# Patient Record
Sex: FEMALE | Race: WHITE | NOT HISPANIC OR LATINO | Employment: OTHER | URBAN - METROPOLITAN AREA
[De-identification: names, ages, dates, MRNs, and addresses within clinical notes are randomized per-mention and may not be internally consistent; named-entity substitution may affect disease eponyms.]

---

## 2017-02-20 RX ORDER — MELOXICAM 7.5 MG/1
TABLET ORAL
Qty: 30 TABLET | Refills: 2 | Status: SHIPPED | OUTPATIENT
Start: 2017-02-20 | End: 2017-06-22 | Stop reason: SDUPTHER

## 2017-06-22 RX ORDER — MELOXICAM 7.5 MG/1
7.5 TABLET ORAL DAILY
Qty: 30 TABLET | Refills: 0 | Status: SHIPPED | OUTPATIENT
Start: 2017-06-22 | End: 2017-09-28 | Stop reason: SDUPTHER

## 2017-07-24 DIAGNOSIS — K21.9 GASTROESOPHAGEAL REFLUX DISEASE, ESOPHAGITIS PRESENCE NOT SPECIFIED: ICD-10-CM

## 2017-07-25 RX ORDER — OMEPRAZOLE 40 MG/1
CAPSULE, DELAYED RELEASE ORAL
Qty: 30 CAPSULE | Refills: 10 | OUTPATIENT
Start: 2017-07-25

## 2017-07-26 ENCOUNTER — TELEPHONE (OUTPATIENT)
Dept: GASTROENTEROLOGY | Facility: CLINIC | Age: 67
End: 2017-07-26

## 2017-07-26 DIAGNOSIS — K21.9 GASTROESOPHAGEAL REFLUX DISEASE, ESOPHAGITIS PRESENCE NOT SPECIFIED: ICD-10-CM

## 2017-07-26 RX ORDER — OMEPRAZOLE 40 MG/1
40 CAPSULE, DELAYED RELEASE ORAL DAILY
Qty: 30 CAPSULE | Refills: 1 | Status: SHIPPED | OUTPATIENT
Start: 2017-07-26 | End: 2017-09-06 | Stop reason: SDUPTHER

## 2017-07-26 NOTE — TELEPHONE ENCOUNTER
Omeprazole escribe completed.  VM to pt - identifies as Sosa.  Message left that refill completed as requested.   If any questions, contact office.

## 2017-07-26 NOTE — TELEPHONE ENCOUNTER
----- Message from Kalee Blackburn sent at 7/26/2017  9:10 AM EDT -----  Regarding: PT CALLED - SEE PENDING REFILL REQUEST  Contact: [phone]  [facility] O/V WITH DR ZAAPTA IN SEP.

## 2017-09-06 ENCOUNTER — OFFICE VISIT (OUTPATIENT)
Dept: GASTROENTEROLOGY | Facility: CLINIC | Age: 67
End: 2017-09-06

## 2017-09-06 VITALS
SYSTOLIC BLOOD PRESSURE: 126 MMHG | HEIGHT: 69 IN | WEIGHT: 189.2 LBS | DIASTOLIC BLOOD PRESSURE: 80 MMHG | BODY MASS INDEX: 28.02 KG/M2 | TEMPERATURE: 98 F

## 2017-09-06 DIAGNOSIS — K21.9 GASTROESOPHAGEAL REFLUX DISEASE, ESOPHAGITIS PRESENCE NOT SPECIFIED: ICD-10-CM

## 2017-09-06 DIAGNOSIS — K76.0 FATTY LIVER: Primary | ICD-10-CM

## 2017-09-06 PROCEDURE — 99213 OFFICE O/P EST LOW 20 MIN: CPT | Performed by: INTERNAL MEDICINE

## 2017-09-06 RX ORDER — OMEPRAZOLE 40 MG/1
40 CAPSULE, DELAYED RELEASE ORAL DAILY
Qty: 30 CAPSULE | Refills: 11 | Status: SHIPPED | OUTPATIENT
Start: 2017-09-06 | End: 2018-08-29 | Stop reason: SDUPTHER

## 2017-09-06 NOTE — PROGRESS NOTES
Chief Complaint   Patient presents with   • Heartburn       History of Present Illness: She is here to get her Omeprazole 40 mg/day. No abdominal or chest pain. No nausea, vomiting. No dysphagia. +constipaton (on Miralax), no diarrhea. No rectal bleeding or melena. Weight stable. In 2010 she had a partial colectomy for diverticular disease. She had ColoGuard in 11/16 and it was negative. She is uptight about having a colonoscopy. We reviewed the results of labs done in 12/16. She drinks most days: 0-2 glasses of wine/day. She has been told she has fatty liver.    Past Medical History:   Diagnosis Date   • Allergic    • Back pain     left when sleep on stomach  chronic   • Chicken pox     childhood   • Condition not found     VASOMOTOR EPISODES   • Constipation     chronic   • Decreased visual acuity    • Diverticulitis    • Diverticulosis    • Eye pain     have been having probelsm with left eye   • Fatigue     Fatigue / loss of energy   • Flushing    • GERD (gastroesophageal reflux disease)     resolved with omeprazole   • Hayfever    • Heartburn    • Hemorrhoids     25 years ago   • Hernia 1999   • Herpes    • History of CT scan of abdomen 07/30/2013   • History of EKG     10/26/2015 NORMAL / NO CHANGE  10/09/14   • History of mammogram     Date of last mammogram 09/2015. Mammogram Normal.  05/01/2014 DR MANZANARES   • History of Papanicolaou smear of cervix     Date of last PAP test: 09/2015. Pap Normal.   • History of TB skin testing 10/09/2014    TB Skin Test   • Hyperlipidemia    • Measles     childhood   • Melanoma     right leg   • Mumps     childhood   • Night sweats    • Pregnancy     Number of Pregnancies: 3.  Number of Miscarriages: 1.  Number of Live Births: 2.   • Sinus trouble    • Skin melanoma 03/2014    Skin cancer - melanoma - 3/2014   • Sleep trouble     Sleep problems has hot flashes, night sweats   • Visual impairment    • Weight gain        Past Surgical History:   Procedure Laterality Date    • COLON RESECTION  12/15/2010    Event: laparoscopic sigmoid colectomy  Provider: Elizabeth Rudd MD   • COLONOSCOPY  08/19/2006    multiple sigm tics, random path neg   • COLONOSCOPY  2006   • ENDOSCOPY  08/19/2006    EGD-erythamatous gastropathy, neg celiac, neg h pylori   • FEMORAL HERNIA REPAIR  2000   • HEMORRHOIDECTOMY     • HERNIA REPAIR Right 1999    hernia in R thigh   • OTHER SURGICAL HISTORY Right     MELANOMA RIGHT LEG   • PAP SMEAR  05/01/2014    DR MANZANARES         Current Outpatient Prescriptions:   •  cetirizine (ZyrTEC) 10 MG tablet, Take 10 mg by mouth daily, Disp: , Rfl:   •  meloxicam (MOBIC) 7.5 MG tablet, Take 1 tablet by mouth Daily., Disp: 30 tablet, Rfl: 0  •  omeprazole (priLOSEC) 40 MG capsule, Take 1 capsule by mouth Daily., Disp: 30 capsule, Rfl: 11  •  PREMPRO 0.3-1.5 MG per tablet, Take 1 tablet by mouth Daily., Disp: , Rfl:     Allergies   Allergen Reactions   • Doxycycline        Family History   Problem Relation Age of Onset   • Arthritis Father      osteoarthritis   • Cancer Father      esophageal   • Hypertension Father    • Ulcers Father      gastric   • Glaucoma Father      also macular degeneration   • Alcohol abuse Father    • Colon polyps Father      colon polyps but no cancer   • Arthritis Paternal Grandmother      rheumatoid   • Alcohol abuse Paternal Uncle    • Breast cancer Paternal Aunt    • Arthritis Maternal Grandmother        Social History     Social History   • Marital status:      Spouse name: N/A   • Number of children: N/A   • Years of education: N/A     Occupational History   • Not on file.     Social History Main Topics   • Smoking status: Never Smoker   • Smokeless tobacco: Never Used   • Alcohol use 6.0 oz/week     10 Glasses of wine per week   • Drug use: No   • Sexual activity: Defer     Other Topics Concern   • Not on file     Social History Narrative       Review of Systems   All other systems reviewed and are negative.      Vitals:    09/06/17 0541    BP: 126/80   Temp: 98 °F (36.7 °C)       Physical Exam   Constitutional: She is oriented to person, place, and time. She appears well-developed and well-nourished. No distress.   HENT:   Head: Normocephalic and atraumatic. Hair is normal.   Right Ear: Hearing, tympanic membrane, external ear and ear canal normal. No drainage. No decreased hearing is noted.   Left Ear: Hearing, tympanic membrane, external ear and ear canal normal. No decreased hearing is noted.   Nose: No nasal deformity.   Mouth/Throat: Oropharynx is clear and moist.   Eyes: Conjunctivae, EOM and lids are normal. Pupils are equal, round, and reactive to light. Right eye exhibits no discharge. Left eye exhibits no discharge.   Neck: Normal range of motion. Neck supple. No JVD present. No tracheal deviation present. No thyromegaly present.   Cardiovascular: Normal rate, regular rhythm, normal heart sounds, intact distal pulses and normal pulses.  Exam reveals no gallop and no friction rub.    No murmur heard.  Pulmonary/Chest: Effort normal and breath sounds normal. No respiratory distress. She has no wheezes. She has no rales. She exhibits no tenderness.   Abdominal: Soft. Bowel sounds are normal. She exhibits no distension and no mass. There is no tenderness. There is no rebound and no guarding. No hernia.   Genitourinary: Rectal exam shows guaiac negative stool.   Genitourinary Comments: Normal anorectal exam.   Musculoskeletal: Normal range of motion. She exhibits no edema, tenderness or deformity.   Lymphadenopathy:     She has no cervical adenopathy.   Neurological: She is alert and oriented to person, place, and time. She has normal reflexes. She displays normal reflexes. No cranial nerve deficit. She exhibits normal muscle tone. Coordination normal.   Skin: Skin is warm and dry. No rash noted. She is not diaphoretic. No erythema.   Psychiatric: She has a normal mood and affect. Her behavior is normal. Judgment and thought content normal.    Vitals reviewed.      Sosa was seen today for heartburn.    Diagnoses and all orders for this visit:    Fatty liver    Gastroesophageal reflux disease, esophagitis presence not specified  -     omeprazole (priLOSEC) 40 MG capsule; Take 1 capsule by mouth Daily.     Assessment:  1) GERD  2) h/o diverticulitis, s/p partial colectomy  3) Fatty liver.    Recommendations:  1) We discussed the pros and cons of chronic PPI use.   2) f/u one year.       No Follow-up on file.    Vincent Greenwood MD  9/6/2017

## 2017-09-28 ENCOUNTER — OFFICE VISIT (OUTPATIENT)
Dept: FAMILY MEDICINE CLINIC | Facility: CLINIC | Age: 67
End: 2017-09-28

## 2017-09-28 VITALS
WEIGHT: 191 LBS | OXYGEN SATURATION: 99 % | DIASTOLIC BLOOD PRESSURE: 72 MMHG | BODY MASS INDEX: 28.29 KG/M2 | SYSTOLIC BLOOD PRESSURE: 116 MMHG | HEART RATE: 76 BPM | TEMPERATURE: 97.6 F | HEIGHT: 69 IN

## 2017-09-28 DIAGNOSIS — Z79.890 POSTMENOPAUSAL HRT (HORMONE REPLACEMENT THERAPY): ICD-10-CM

## 2017-09-28 DIAGNOSIS — R79.89 OTHER SPECIFIED ABNORMAL FINDINGS OF BLOOD CHEMISTRY: ICD-10-CM

## 2017-09-28 DIAGNOSIS — M19.91 PRIMARY OSTEOARTHRITIS, UNSPECIFIED SITE: Primary | ICD-10-CM

## 2017-09-28 DIAGNOSIS — K21.9 GASTROESOPHAGEAL REFLUX DISEASE WITHOUT ESOPHAGITIS: ICD-10-CM

## 2017-09-28 DIAGNOSIS — R73.03 PREDIABETES: ICD-10-CM

## 2017-09-28 PROCEDURE — 99213 OFFICE O/P EST LOW 20 MIN: CPT | Performed by: FAMILY MEDICINE

## 2017-09-28 RX ORDER — MELOXICAM 7.5 MG/1
7.5 TABLET ORAL DAILY
Qty: 30 TABLET | Refills: 5 | Status: SHIPPED | OUTPATIENT
Start: 2017-09-28 | End: 2018-07-11 | Stop reason: SDUPTHER

## 2017-09-28 NOTE — PROGRESS NOTES
"  No chief complaint on file.      Subjective.../HPI  Patient present today with oa and takes meloxicam . lucy only takes qod .   - had cscope in 2006 and colon resection 2010 then cologuard in 2016. Sees dr greenwood  - has gerd and hrt  I have reviewed the patient's medical history in detail and updated the computerized patient record.    Family History   Problem Relation Age of Onset   • Arthritis Father      osteoarthritis   • Cancer Father      esophageal   • Hypertension Father    • Ulcers Father      gastric   • Glaucoma Father      also macular degeneration   • Alcohol abuse Father    • Colon polyps Father      colon polyps but no cancer   • Arthritis Paternal Grandmother      rheumatoid   • Alcohol abuse Paternal Uncle    • Breast cancer Paternal Aunt    • Arthritis Maternal Grandmother        Social History     Social History   • Marital status:      Spouse name: N/A   • Number of children: N/A   • Years of education: N/A     Occupational History   • Not on file.     Social History Main Topics   • Smoking status: Never Smoker   • Smokeless tobacco: Never Used   • Alcohol use 6.0 oz/week     10 Glasses of wine per week   • Drug use: No   • Sexual activity: Defer     Other Topics Concern   • Not on file     Social History Narrative       Review of Systems:   Review of Systems   Constitutional: Negative.    HENT: Negative.    Respiratory: Negative.    Cardiovascular: Negative.    Gastrointestinal: Negative.    Endocrine: Negative.    Genitourinary: Negative.    Musculoskeletal: Negative.    Skin: Negative.    Allergic/Immunologic: Negative.    Neurological: Negative.    Hematological: Negative.    Psychiatric/Behavioral: Negative.        Objective:  Vital Signs     Vitals:    09/28/17 1603   BP: 116/72   BP Location: Left arm   Patient Position: Sitting   Pulse: 76   Temp: 97.6 °F (36.4 °C)   TempSrc: Oral   SpO2: 99%   Weight: 191 lb (86.6 kg)   Height: 69\" (175.3 cm)     Physical Exam   Constitutional: " She is oriented to person, place, and time. She appears well-developed and well-nourished.   HENT:   Head: Normocephalic.   Eyes: Pupils are equal, round, and reactive to light.   Neck: Normal range of motion.   Cardiovascular: Normal rate, regular rhythm and normal heart sounds.    Pulmonary/Chest: Effort normal.   Abdominal: Soft. Bowel sounds are normal. She exhibits no distension and no mass. There is no tenderness. There is no rebound and no guarding.   Musculoskeletal: Normal range of motion.   Neurological: She is alert and oriented to person, place, and time.   Skin: Skin is warm and dry.        Results Review:      REVIEWED AND DISCUSSED CLINICAL RESULTS WITH PATIENT                          Current Outpatient Prescriptions:   •  cetirizine (ZyrTEC) 10 MG tablet, Take 10 mg by mouth daily, Disp: , Rfl:   •  meloxicam (MOBIC) 7.5 MG tablet, Take 1 tablet by mouth Daily., Disp: 30 tablet, Rfl: 5  •  omeprazole (priLOSEC) 40 MG capsule, Take 1 capsule by mouth Daily., Disp: 30 capsule, Rfl: 11  •  PREMPRO 0.3-1.5 MG per tablet, Take 1 tablet by mouth Daily., Disp: , Rfl:     Procedures    Assessment/Plan     Diagnoses and all orders for this visit:    Primary osteoarthritis, unspecified site  -     CBC & Differential  -     Comprehensive Metabolic Panel  -     Hemoglobin A1c  -     Lipid Panel With LDL / HDL Ratio  -     Thyroid Panel With TSH    Gastroesophageal reflux disease without esophagitis  -     CBC & Differential  -     Comprehensive Metabolic Panel  -     Hemoglobin A1c  -     Lipid Panel With LDL / HDL Ratio  -     Thyroid Panel With TSH    Postmenopausal HRT (hormone replacement therapy)  -     CBC & Differential  -     Comprehensive Metabolic Panel  -     Hemoglobin A1c  -     Lipid Panel With LDL / HDL Ratio  -     Thyroid Panel With TSH    Other specified abnormal findings of blood chemistry   -     Hemoglobin A1c  -     Lipid Panel With LDL / HDL Ratio    Prediabetes   -     Thyroid Panel With  TSH    Other orders  -     meloxicam (MOBIC) 7.5 MG tablet; Take 1 tablet by mouth Daily.         Adam Garber Jr., MD  09/28/17  5:09 PM

## 2018-07-11 ENCOUNTER — OFFICE VISIT (OUTPATIENT)
Dept: INTERNAL MEDICINE | Facility: CLINIC | Age: 68
End: 2018-07-11

## 2018-07-11 VITALS
TEMPERATURE: 98.8 F | DIASTOLIC BLOOD PRESSURE: 70 MMHG | SYSTOLIC BLOOD PRESSURE: 126 MMHG | BODY MASS INDEX: 28.64 KG/M2 | RESPIRATION RATE: 16 BRPM | HEIGHT: 69 IN | OXYGEN SATURATION: 98 % | HEART RATE: 75 BPM | WEIGHT: 193.4 LBS

## 2018-07-11 DIAGNOSIS — E78.00 ELEVATED LDL CHOLESTEROL LEVEL: Primary | ICD-10-CM

## 2018-07-11 DIAGNOSIS — K21.9 GASTROESOPHAGEAL REFLUX DISEASE, ESOPHAGITIS PRESENCE NOT SPECIFIED: ICD-10-CM

## 2018-07-11 DIAGNOSIS — R73.09 ELEVATED GLUCOSE: ICD-10-CM

## 2018-07-11 DIAGNOSIS — M47.817 FACET ARTHRITIS OF LUMBOSACRAL REGION: ICD-10-CM

## 2018-07-11 PROCEDURE — 99213 OFFICE O/P EST LOW 20 MIN: CPT | Performed by: FAMILY MEDICINE

## 2018-07-11 RX ORDER — MELOXICAM 7.5 MG/1
7.5 TABLET ORAL DAILY
Qty: 90 TABLET | Refills: 3 | Status: SHIPPED | OUTPATIENT
Start: 2018-07-11 | End: 2019-07-10 | Stop reason: SDUPTHER

## 2018-07-11 NOTE — PROGRESS NOTES
Subjective   Sosa Diamond is a 68 y.o. female.     Chief Complaint   Patient presents with   • Hyperlipidemia   • Osteoarthritis   • Back Pain         History of Present Illness   Patient is delightful lady who is previously seen Dr. Garber.  She is a  at [employer].  History includes some history of treatment of hyperlipidemia and also facet arthritis of the back osteoarthritis of the knees and possible hip arthralgias.  She takes occasional meloxicam 7.5 mg.      The following portions of the patient's history were reviewed and updated as appropriate: allergies, current medications, past social history and problem list.    Review of Systems   Constitutional: Negative.    Eyes: Negative.    Respiratory: Negative.    Cardiovascular: Negative.    Gastrointestinal: Negative.    Endocrine: Negative.    Genitourinary: Negative.    Musculoskeletal: Positive for arthralgias.   Skin: Negative.    Allergic/Immunologic: Negative.    Neurological: Negative.    Hematological: Negative.    Psychiatric/Behavioral: Negative.        Objective   Vitals:    07/11/18 1206   BP: 126/70   Pulse: 75   Resp: 16   Temp: 98.8 °F (37.1 °C)   SpO2: 98%     Physical Exam   Constitutional: She is oriented to person, place, and time. She appears well-developed.   HENT:   Head: Normocephalic.   Right Ear: Tympanic membrane and external ear normal.   Left Ear: Tympanic membrane and external ear normal.   Mouth/Throat: Oropharynx is clear and moist.   Eyes: Pupils are equal, round, and reactive to light.   Neck: Normal range of motion. Neck supple.   Cardiovascular: Normal rate, regular rhythm and normal heart sounds.    Pulmonary/Chest: Effort normal and breath sounds normal.   Abdominal: Soft. Bowel sounds are normal.   Musculoskeletal:        Right knee: She exhibits decreased range of motion.        Left knee: She exhibits decreased range of motion.        Lumbar back: She exhibits decreased range of motion.   Neurological: She  is alert and oriented to person, place, and time.   Psychiatric: She has a normal mood and affect.   Vitals reviewed.      Assessment/Plan   Problem List Items Addressed This Visit     None      Visit Diagnoses     Elevated LDL cholesterol level    -  Primary    Relevant Orders    CBC & Differential    Comprehensive Metabolic Panel    Hemoglobin A1c    Lipid Panel With / Chol / HDL Ratio    TSH    T4, Free    Gastroesophageal reflux disease, esophagitis presence not specified        Relevant Orders    CBC & Differential    Comprehensive Metabolic Panel    Hemoglobin A1c    Lipid Panel With / Chol / HDL Ratio    TSH    T4, Free    Facet arthritis of lumbosacral region (CMS/HCC)        Elevated glucose         Relevant Orders    Hemoglobin A1c      Plan: Screening labs to be done prior to Medicare wellness in October and November refill meloxicam.  Also refill omeprazole.

## 2018-07-25 ENCOUNTER — RESULTS ENCOUNTER (OUTPATIENT)
Dept: INTERNAL MEDICINE | Facility: CLINIC | Age: 68
End: 2018-07-25

## 2018-07-25 DIAGNOSIS — R73.09 ELEVATED GLUCOSE: ICD-10-CM

## 2018-07-25 DIAGNOSIS — K21.9 GASTROESOPHAGEAL REFLUX DISEASE, ESOPHAGITIS PRESENCE NOT SPECIFIED: ICD-10-CM

## 2018-07-25 DIAGNOSIS — E78.00 ELEVATED LDL CHOLESTEROL LEVEL: ICD-10-CM

## 2018-08-29 ENCOUNTER — OFFICE VISIT (OUTPATIENT)
Dept: GASTROENTEROLOGY | Facility: CLINIC | Age: 68
End: 2018-08-29

## 2018-08-29 VITALS
DIASTOLIC BLOOD PRESSURE: 84 MMHG | WEIGHT: 189.8 LBS | BODY MASS INDEX: 28.11 KG/M2 | HEIGHT: 69 IN | SYSTOLIC BLOOD PRESSURE: 126 MMHG | TEMPERATURE: 98.2 F

## 2018-08-29 DIAGNOSIS — K21.9 GASTROESOPHAGEAL REFLUX DISEASE, ESOPHAGITIS PRESENCE NOT SPECIFIED: ICD-10-CM

## 2018-08-29 DIAGNOSIS — K76.0 FATTY LIVER: Primary | ICD-10-CM

## 2018-08-29 PROCEDURE — 99213 OFFICE O/P EST LOW 20 MIN: CPT | Performed by: INTERNAL MEDICINE

## 2018-08-29 RX ORDER — OMEPRAZOLE 40 MG/1
40 CAPSULE, DELAYED RELEASE ORAL DAILY
Qty: 30 CAPSULE | Refills: 11 | Status: SHIPPED | OUTPATIENT
Start: 2018-08-29 | End: 2019-09-30 | Stop reason: SDUPTHER

## 2018-08-29 NOTE — PROGRESS NOTES
Chief Complaint   Patient presents with   • Follow-up   • Heartburn     medication refill       History of Present Illness: 69 yo female with GERD. She only takes the Mobic a couple of times/week. When she stops the Omeprazole she has heartburn. No abdominal or chest pain. No nasuea or vomiting. No diverticulitis. No rectal bleeding or melena. She had a ColoGuard one year ago and it was negative. Weight stable.     Past Medical History:   Diagnosis Date   • Allergic    • Back pain     left when sleep on stomach  chronic   • Chicken pox     childhood   • Condition not found     VASOMOTOR EPISODES   • Constipation     chronic   • Decreased visual acuity    • Diverticulitis    • Diverticulosis    • Eye pain     have been having probelsm with left eye   • Fatigue     Fatigue / loss of energy   • Flushing    • GERD (gastroesophageal reflux disease)     resolved with omeprazole   • Hayfever    • Heartburn    • Hemorrhoids     25 years ago   • Hernia 1999   • Herpes    • History of CT scan of abdomen 07/30/2013   • History of EKG     10/26/2015 NORMAL / NO CHANGE  10/09/14   • History of mammogram     Date of last mammogram 09/2015. Mammogram Normal.  05/01/2014 DR MANZANARES   • History of Papanicolaou smear of cervix     Date of last PAP test: 09/2015. Pap Normal.   • History of TB skin testing 10/09/2014    TB Skin Test   • Hyperlipidemia    • Measles     childhood   • Melanoma (CMS/HCC)     right leg   • Mumps     childhood   • Night sweats    • Pregnancy     Number of Pregnancies: 3.  Number of Miscarriages: 1.  Number of Live Births: 2.   • Sinus trouble    • Skin melanoma (CMS/HCC) 03/2014    Skin cancer - melanoma - 3/2014   • Sleep trouble     Sleep problems has hot flashes, night sweats   • Visual impairment    • Weight gain        Past Surgical History:   Procedure Laterality Date   • COLON RESECTION  12/15/2010    Event: laparoscopic sigmoid colectomy  Provider: Elizabeth Rudd MD   • COLONOSCOPY  08/19/2006     multiple sigm tics, random path neg   • COLONOSCOPY  2006   • ENDOSCOPY  08/19/2006    EGD-erythamatous gastropathy, neg celiac, neg h pylori   • FEMORAL HERNIA REPAIR  2000   • HEMORRHOIDECTOMY     • HERNIA REPAIR Right 1999    hernia in R thigh   • OTHER SURGICAL HISTORY Right     MELANOMA RIGHT LEG   • PAP SMEAR  05/01/2014    DR MANZANARES         Current Outpatient Prescriptions:   •  cetirizine (ZyrTEC) 10 MG tablet, Take 10 mg by mouth daily, Disp: , Rfl:   •  meloxicam (MOBIC) 7.5 MG tablet, Take 1 tablet by mouth Daily. (Patient taking differently: Take 7.5 mg by mouth 3 (Three) Times a Week.), Disp: 90 tablet, Rfl: 3  •  omeprazole (priLOSEC) 40 MG capsule, Take 1 capsule by mouth Daily., Disp: 30 capsule, Rfl: 11  •  PREMPRO 0.3-1.5 MG per tablet, Take 1 tablet by mouth Daily., Disp: , Rfl:     Allergies   Allergen Reactions   • Doxycycline Itching       Family History   Problem Relation Age of Onset   • Arthritis Father         osteoarthritis   • Cancer Father         esophageal   • Hypertension Father    • Ulcers Father         gastric   • Glaucoma Father         also macular degeneration   • Alcohol abuse Father    • Colon polyps Father         colon polyps but no cancer   • Arthritis Paternal Grandmother         rheumatoid   • Alcohol abuse Paternal Uncle    • Breast cancer Paternal Aunt    • Arthritis Maternal Grandmother        Social History     Social History   • Marital status:      Spouse name: N/A   • Number of children: N/A   • Years of education: N/A     Occupational History   • Not on file.     Social History Main Topics   • Smoking status: Never Smoker   • Smokeless tobacco: Never Used   • Alcohol use 6.0 oz/week     10 Glasses of wine per week   • Drug use: No   • Sexual activity: Defer     Other Topics Concern   • Not on file     Social History Narrative   • No narrative on file       Review of Systems   All other systems reviewed and are negative.      Vitals:    08/29/18 1617   BP:  126/84   Temp: 98.2 °F (36.8 °C)       Physical Exam   Constitutional: She is oriented to person, place, and time. She appears well-developed and well-nourished. No distress.   HENT:   Head: Normocephalic and atraumatic. Hair is normal.   Right Ear: Hearing, tympanic membrane, external ear and ear canal normal. No drainage. No decreased hearing is noted.   Left Ear: Hearing, tympanic membrane, external ear and ear canal normal. No decreased hearing is noted.   Nose: No nasal deformity.   Mouth/Throat: Oropharynx is clear and moist.   Eyes: Pupils are equal, round, and reactive to light. Conjunctivae, EOM and lids are normal. Right eye exhibits no discharge. Left eye exhibits no discharge.   Neck: Normal range of motion. Neck supple. No JVD present. No tracheal deviation present. No thyromegaly present.   Cardiovascular: Normal rate, regular rhythm, normal heart sounds, intact distal pulses and normal pulses.  Exam reveals no gallop and no friction rub.    No murmur heard.  Pulmonary/Chest: Effort normal and breath sounds normal. No respiratory distress. She has no wheezes. She has no rales. She exhibits no tenderness.   Abdominal: Soft. Bowel sounds are normal. She exhibits no distension and no mass. There is no tenderness. There is no rebound and no guarding. No hernia.   Musculoskeletal: Normal range of motion. She exhibits no edema, tenderness or deformity.   Lymphadenopathy:     She has no cervical adenopathy.   Neurological: She is alert and oriented to person, place, and time. She has normal reflexes. She displays normal reflexes. No cranial nerve deficit. She exhibits normal muscle tone. Coordination normal.   Skin: Skin is warm and dry. No rash noted. She is not diaphoretic. No erythema.   Psychiatric: She has a normal mood and affect. Her behavior is normal. Judgment and thought content normal.   Vitals reviewed.      Sosa was seen today for follow-up and heartburn.    Diagnoses and all orders for this  visit:    Fatty liver    Gastroesophageal reflux disease, esophagitis presence not specified  -     omeprazole (priLOSEC) 40 MG capsule; Take 1 capsule by mouth Daily.      Assessment:  1) GERD  2) h/o diverticulitis, s/p partial colectomy  3) Fatty liver    Recommendations:  1) Continue the Omeprazole 40 mg/day  2) f/u one year.     Return in about 1 year (around 8/29/2019).    Vincent Greenwood MD  8/29/2018

## 2018-10-05 LAB
ALBUMIN SERPL-MCNC: 4.8 G/DL (ref 3.5–5.2)
ALBUMIN/GLOB SERPL: 1.8 G/DL
ALP SERPL-CCNC: 77 U/L (ref 39–117)
ALT SERPL-CCNC: 38 U/L (ref 1–33)
AST SERPL-CCNC: 28 U/L (ref 1–32)
BASOPHILS # BLD AUTO: 0.03 10*3/MM3 (ref 0–0.2)
BASOPHILS NFR BLD AUTO: 0.5 % (ref 0–1.5)
BILIRUB SERPL-MCNC: 0.5 MG/DL (ref 0.1–1.2)
BUN SERPL-MCNC: 12 MG/DL (ref 8–23)
BUN/CREAT SERPL: 16 (ref 7–25)
CALCIUM SERPL-MCNC: 9.9 MG/DL (ref 8.6–10.5)
CHLORIDE SERPL-SCNC: 99 MMOL/L (ref 98–107)
CHOLEST SERPL-MCNC: 223 MG/DL (ref 0–200)
CHOLEST/HDLC SERPL: 3.66 {RATIO}
CO2 SERPL-SCNC: 25.5 MMOL/L (ref 22–29)
CREAT SERPL-MCNC: 0.75 MG/DL (ref 0.57–1)
EOSINOPHIL # BLD AUTO: 0.07 10*3/MM3 (ref 0–0.7)
EOSINOPHIL NFR BLD AUTO: 1.2 % (ref 0.3–6.2)
ERYTHROCYTE [DISTWIDTH] IN BLOOD BY AUTOMATED COUNT: 13.1 % (ref 11.7–13)
GLOBULIN SER CALC-MCNC: 2.7 GM/DL
GLUCOSE SERPL-MCNC: 94 MG/DL (ref 65–99)
HBA1C MFR BLD: 4.9 % (ref 4.8–5.6)
HCT VFR BLD AUTO: 43.2 % (ref 35.6–45.5)
HDLC SERPL-MCNC: 61 MG/DL (ref 40–60)
HGB BLD-MCNC: 14.9 G/DL (ref 11.9–15.5)
IMM GRANULOCYTES # BLD: 0.01 10*3/MM3 (ref 0–0.03)
IMM GRANULOCYTES NFR BLD: 0.2 % (ref 0–0.5)
LDLC SERPL CALC-MCNC: 127 MG/DL (ref 0–100)
LYMPHOCYTES # BLD AUTO: 1.92 10*3/MM3 (ref 0.9–4.8)
LYMPHOCYTES NFR BLD AUTO: 31.6 % (ref 19.6–45.3)
MCH RBC QN AUTO: 32.7 PG (ref 26.9–32)
MCHC RBC AUTO-ENTMCNC: 34.5 G/DL (ref 32.4–36.3)
MCV RBC AUTO: 94.7 FL (ref 80.5–98.2)
MONOCYTES # BLD AUTO: 0.34 10*3/MM3 (ref 0.2–1.2)
MONOCYTES NFR BLD AUTO: 5.6 % (ref 5–12)
NEUTROPHILS # BLD AUTO: 3.71 10*3/MM3 (ref 1.9–8.1)
NEUTROPHILS NFR BLD AUTO: 61.1 % (ref 42.7–76)
PLATELET # BLD AUTO: 293 10*3/MM3 (ref 140–500)
POTASSIUM SERPL-SCNC: 4.2 MMOL/L (ref 3.5–5.2)
PROT SERPL-MCNC: 7.5 G/DL (ref 6–8.5)
RBC # BLD AUTO: 4.56 10*6/MM3 (ref 3.9–5.2)
SODIUM SERPL-SCNC: 137 MMOL/L (ref 136–145)
T4 FREE SERPL-MCNC: 1.03 NG/DL (ref 0.93–1.7)
TRIGL SERPL-MCNC: 176 MG/DL (ref 0–150)
TSH SERPL DL<=0.005 MIU/L-ACNC: 1.89 MIU/ML (ref 0.27–4.2)
VLDLC SERPL CALC-MCNC: 35.2 MG/DL (ref 5–40)
WBC # BLD AUTO: 6.07 10*3/MM3 (ref 4.5–10.7)

## 2018-10-30 ENCOUNTER — OFFICE VISIT (OUTPATIENT)
Dept: INTERNAL MEDICINE | Facility: CLINIC | Age: 68
End: 2018-10-30

## 2018-10-30 VITALS
SYSTOLIC BLOOD PRESSURE: 134 MMHG | HEART RATE: 97 BPM | WEIGHT: 196 LBS | DIASTOLIC BLOOD PRESSURE: 96 MMHG | TEMPERATURE: 98.1 F | BODY MASS INDEX: 29.8 KG/M2 | OXYGEN SATURATION: 97 %

## 2018-10-30 DIAGNOSIS — Z00.00 MEDICARE ANNUAL WELLNESS VISIT, SUBSEQUENT: ICD-10-CM

## 2018-10-30 DIAGNOSIS — E55.9 VITAMIN D DEFICIENCY: ICD-10-CM

## 2018-10-30 DIAGNOSIS — E78.2 MIXED HYPERLIPIDEMIA: Primary | ICD-10-CM

## 2018-10-30 DIAGNOSIS — M25.562 LEFT KNEE PAIN, UNSPECIFIED CHRONICITY: ICD-10-CM

## 2018-10-30 PROCEDURE — G0439 PPPS, SUBSEQ VISIT: HCPCS | Performed by: FAMILY MEDICINE

## 2018-10-30 PROCEDURE — 99213 OFFICE O/P EST LOW 20 MIN: CPT | Performed by: FAMILY MEDICINE
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
## 2018-10-30 NOTE — PROGRESS NOTES
QUICK REFERENCE INFORMATION:  The ABCs of the Annual Wellness Visit    Subsequent Medicare Wellness Visit    HEALTH RISK ASSESSMENT    1950    Recent Hospitalizations:  No hospitalization(s) within the last year..        Current Medical Providers:  Patient Care Team:  Zaheer Mccrary Jr., MD as PCP - General (Family Medicine)  Tomasa Strong MD as PCP - Claims Attributed        Smoking Status:  History   Smoking Status   • Never Smoker   Smokeless Tobacco   • Never Used       Alcohol Consumption:  History   Alcohol Use   • 6.0 oz/week   • 10 Glasses of wine per week       Depression Screen:   PHQ-2/PHQ-9 Depression Screening 10/30/2018   Little interest or pleasure in doing things 0   Feeling down, depressed, or hopeless 0   Total Score 0       Health Habits and Functional and Cognitive Screening:  Functional & Cognitive Status 10/30/2018   Do you have difficulty preparing food and eating? No   Do you have difficulty bathing yourself, getting dressed or grooming yourself? No   Do you have difficulty using the toilet? No   Do you have difficulty moving around from place to place? No   Do you have trouble with steps or getting out of a bed or a chair? No   In the past year have you fallen or experienced a near fall? No   Current Diet Well Balanced Diet   Dental Exam Up to date   Eye Exam Up to date   Exercise (times per week) 0 times per week   Current Exercise Activities Include None   Do you need help using the phone?  No   Are you deaf or do you have serious difficulty hearing?  No   Do you need help with transportation? No   Do you need help shopping? No   Do you need help preparing meals?  No   Do you need help with housework?  No   Do you need help with laundry? No   Do you need help taking your medications? No   Do you need help managing money? No   Do you ever drive or ride in a car without wearing a seat belt? No   Have you felt unusual stress, anger or loneliness in the last month? No   Who do you live  with? Spouse   If you need help, do you have trouble finding someone available to you? No   Have you been bothered in the last four weeks by sexual problems? No   Do you have difficulty concentrating, remembering or making decisions? No           Does the patient have evidence of cognitive impairment? No    Aspirin use counseling: Does not need ASA (and currently is not on it)      Recent Lab Results:  CMP:  Lab Results   Component Value Date    GLU 94 10/05/2018    BUN 12 10/05/2018    CREATININE 0.75 10/05/2018    EGFRIFNONA 77 10/05/2018    EGFRIFAFRI 94 10/05/2018    BCR 16.0 10/05/2018     10/05/2018    K 4.2 10/05/2018    CO2 25.5 10/05/2018    CALCIUM 9.9 10/05/2018    PROTENTOTREF 7.5 10/05/2018    ALBUMIN 4.80 10/05/2018    LABGLOBREF 2.7 10/05/2018    LABIL2 1.8 10/05/2018    BILITOT 0.5 10/05/2018    ALKPHOS 77 10/05/2018    AST 28 10/05/2018    ALT 38 (H) 10/05/2018     Lipid Panel:  Lab Results   Component Value Date    TRIG 176 (H) 10/05/2018    HDL 61 (H) 10/05/2018    VLDL 35.2 10/05/2018    LDLHDL 2.4 10/27/2016     HbA1c:  Lab Results   Component Value Date    HGBA1C 4.90 10/05/2018       Visual Acuity:  No exam data present    Age-appropriate Screening Schedule:  Refer to the list below for future screening recommendations based on patient's age, sex and/or medical conditions. Orders for these recommended tests are listed in the plan section. The patient has been provided with a written plan.    Health Maintenance   Topic Date Due   • COLONOSCOPY  07/06/2016   • ZOSTER VACCINE (2 of 3) 01/27/2017   • PNEUMOCOCCAL VACCINES (65+ LOW/MEDIUM RISK) (1 of 2 - PCV13) 05/19/2017   • INFLUENZA VACCINE  08/01/2018   • LIPID PANEL  10/05/2019   • MAMMOGRAM  11/08/2019   • TDAP/TD VACCINES (2 - Td) 10/09/2025        Subjective   History of Present Illness    Sosa Diamond is a 68 y.o. female who presents for an Subsequent Wellness Visit.    The following portions of the patient's history were reviewed  and updated as appropriate: allergies, current medications, past family history, past medical history, past social history, past surgical history and problem list.    Outpatient Medications Prior to Visit   Medication Sig Dispense Refill   • cetirizine (ZyrTEC) 10 MG tablet Take 10 mg by mouth daily     • meloxicam (MOBIC) 7.5 MG tablet Take 1 tablet by mouth Daily. (Patient taking differently: Take 7.5 mg by mouth 3 (Three) Times a Week.) 90 tablet 3   • omeprazole (priLOSEC) 40 MG capsule Take 1 capsule by mouth Daily. 30 capsule 11   • PREMPRO 0.3-1.5 MG per tablet Take 1 tablet by mouth Daily.     • fluticasone (FLONASE) 50 MCG/ACT nasal spray 2 sprays into the nostril(s) as directed by provider Daily.     • acetaminophen (TYLENOL) 100 MG/ML solution Take 10 mg/kg by mouth Every 4 (Four) Hours As Needed for Fever.     • amoxicillin-clavulanate (AUGMENTIN) 875-125 MG per tablet 1 pill q12 28 tablet 0   • ibuprofen (ADVIL,MOTRIN) 100 MG/5ML suspension Take  by mouth Every 6 (Six) Hours As Needed for Mild Pain .       No facility-administered medications prior to visit.        There is no problem list on file for this patient.      Advance Care Planning:  has an advance directive - a copy has been provided and is in file    Identification of Risk Factors:  Risk factors include: cardiovascular risk.    Review of Systems    Compared to one year ago, the patient feels her physical health is worse.  Compared to one year ago, the patient feels her mental health is the same.    Objective     Physical Exam    Vitals:    10/30/18 1348   BP: 134/96   BP Location: Left arm   Patient Position: Sitting   Cuff Size: Adult   Pulse: 97   Temp: 98.1 °F (36.7 °C)   TempSrc: Tympanic   SpO2: 97%   Weight: 88.9 kg (196 lb)   PainSc: 0-No pain       Patient's Body mass index is 29.8 kg/m². BMI is above normal parameters. Recommendations include: educational material.      Assessment/Plan   Patient Self-Management and Personalized  Health Advice  The patient has been provided with information about: prevention of cardiac or vascular disease and preventive services including:   · Counseling for cardiovascular disease risk reduction.    Visit Diagnoses:  No diagnosis found.    No orders of the defined types were placed in this encounter.      Outpatient Encounter Prescriptions as of 10/30/2018   Medication Sig Dispense Refill   • cetirizine (ZyrTEC) 10 MG tablet Take 10 mg by mouth daily     • meloxicam (MOBIC) 7.5 MG tablet Take 1 tablet by mouth Daily. (Patient taking differently: Take 7.5 mg by mouth 3 (Three) Times a Week.) 90 tablet 3   • omeprazole (priLOSEC) 40 MG capsule Take 1 capsule by mouth Daily. 30 capsule 11   • PREMPRO 0.3-1.5 MG per tablet Take 1 tablet by mouth Daily.     • fluticasone (FLONASE) 50 MCG/ACT nasal spray 2 sprays into the nostril(s) as directed by provider Daily.     • [DISCONTINUED] acetaminophen (TYLENOL) 100 MG/ML solution Take 10 mg/kg by mouth Every 4 (Four) Hours As Needed for Fever.     • [DISCONTINUED] amoxicillin-clavulanate (AUGMENTIN) 875-125 MG per tablet 1 pill q12 28 tablet 0   • [DISCONTINUED] ibuprofen (ADVIL,MOTRIN) 100 MG/5ML suspension Take  by mouth Every 6 (Six) Hours As Needed for Mild Pain .       No facility-administered encounter medications on file as of 10/30/2018.        Reviewed use of high risk medication in the elderly: not applicable  Reviewed for potential of harmful drug interactions in the elderly: not applicable    Follow Up:  No Follow-up on file.     An After Visit Summary and PPPS with all of these plans were given to the patient.

## 2019-07-10 LAB
25(OH)D3+25(OH)D2 SERPL-MCNC: 44.9 NG/ML (ref 30–100)
ALBUMIN SERPL-MCNC: 4.4 G/DL (ref 3.5–5.2)
ALBUMIN/GLOB SERPL: 2 G/DL
ALP SERPL-CCNC: 63 U/L (ref 39–117)
ALT SERPL-CCNC: 27 U/L (ref 1–33)
AST SERPL-CCNC: 29 U/L (ref 1–32)
BILIRUB SERPL-MCNC: 0.5 MG/DL (ref 0.2–1.2)
BUN SERPL-MCNC: 11 MG/DL (ref 8–23)
BUN/CREAT SERPL: 15.1 (ref 7–25)
CALCIUM SERPL-MCNC: 9.2 MG/DL (ref 8.6–10.5)
CHLORIDE SERPL-SCNC: 99 MMOL/L (ref 98–107)
CHOLEST SERPL-MCNC: 206 MG/DL (ref 0–200)
CHOLEST/HDLC SERPL: 3.43 {RATIO}
CO2 SERPL-SCNC: 27.5 MMOL/L (ref 22–29)
CREAT SERPL-MCNC: 0.73 MG/DL (ref 0.57–1)
GLOBULIN SER CALC-MCNC: 2.2 GM/DL
GLUCOSE SERPL-MCNC: 101 MG/DL (ref 65–99)
HDLC SERPL-MCNC: 60 MG/DL (ref 40–60)
LDLC SERPL CALC-MCNC: 122 MG/DL (ref 0–100)
POTASSIUM SERPL-SCNC: 4.7 MMOL/L (ref 3.5–5.2)
PROT SERPL-MCNC: 6.6 G/DL (ref 6–8.5)
SODIUM SERPL-SCNC: 137 MMOL/L (ref 136–145)
TRIGL SERPL-MCNC: 118 MG/DL (ref 0–150)
VLDLC SERPL CALC-MCNC: 23.6 MG/DL

## 2019-07-10 RX ORDER — MELOXICAM 7.5 MG/1
7.5 TABLET ORAL 3 TIMES WEEKLY
Qty: 30 TABLET | Refills: 2 | Status: SHIPPED | OUTPATIENT
Start: 2019-07-10 | End: 2020-01-22 | Stop reason: SDUPTHER

## 2019-07-22 ENCOUNTER — OFFICE VISIT (OUTPATIENT)
Dept: INTERNAL MEDICINE | Facility: CLINIC | Age: 69
End: 2019-07-22

## 2019-07-22 VITALS
WEIGHT: 194.4 LBS | HEART RATE: 96 BPM | TEMPERATURE: 97.4 F | SYSTOLIC BLOOD PRESSURE: 111 MMHG | RESPIRATION RATE: 16 BRPM | HEIGHT: 68 IN | DIASTOLIC BLOOD PRESSURE: 73 MMHG | BODY MASS INDEX: 29.46 KG/M2 | OXYGEN SATURATION: 98 %

## 2019-07-22 DIAGNOSIS — Z00.00 HEALTHCARE MAINTENANCE: Primary | ICD-10-CM
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
## 2019-07-22 DIAGNOSIS — E78.2 MIXED HYPERLIPIDEMIA: ICD-10-CM

## 2019-07-22 DIAGNOSIS — Z23 NEED FOR VACCINATION AGAINST STREPTOCOCCUS PNEUMONIAE USING PNEUMOCOCCAL CONJUGATE VACCINE 13: ICD-10-CM

## 2019-07-22 DIAGNOSIS — K21.9 GASTROESOPHAGEAL REFLUX DISEASE, ESOPHAGITIS PRESENCE NOT SPECIFIED: ICD-10-CM

## 2019-07-22 PROCEDURE — G0009 ADMIN PNEUMOCOCCAL VACCINE: HCPCS | Performed by: NURSE PRACTITIONER

## 2019-07-22 PROCEDURE — 99214 OFFICE O/P EST MOD 30 MIN: CPT | Performed by: NURSE PRACTITIONER

## 2019-07-22 PROCEDURE — 90670 PCV13 VACCINE IM: CPT | Performed by: NURSE PRACTITIONER

## 2019-07-22 RX ORDER — AZELAIC ACID 0.15 G/G
AEROSOL, FOAM TOPICAL
COMMUNITY
Start: 2019-07-15 | End: 2020-01-22

## 2019-07-22 NOTE — PROGRESS NOTES
Subjective   Sosa Diamond is a 69 y.o. female.   CC: 6-month follow-up, GERD, knee pain, chronic, fall with right rib pain    Patient presents for six-month follow-up.  This is a 69-year-old female patient of Dr. zaidi.  This patient is new to me.    She fell on a gravel road about 7 days ago, and has had some pain in her right side ever since.  It is gradually getting better, but still present.  She denies any redness or swelling in the area.  She states that she has some slight pain when she takes the deep breath, and when she takes her bra off at night, she has some increasing pain in the side and wonders whether this is because she has affected muscle that the breast unsupported is pulling on.  She denies any shortness of breath, palpitations.  She is able to move around, but has pain associated with certain twisting movements.    She has a history of knee pain and osteoarthritis and takes meloxicam for this which controls symptoms well.    She has a history of GERD as well and takes omeprazole 40 mg daily and this controls the condition well.    She is due for colonoscopy but states that she has been discussing this with her gastroenterologist, Dr. Rao and is deferring at this time.  She is due for Prevnar 13 today.  She denies development of any other new issues today.         The following portions of the patient's history were reviewed and updated as appropriate: allergies, current medications, past family history, past medical history, past social history, past surgical history and problem list.    Review of Systems   Constitutional: Negative for activity change, chills, fatigue, fever, unexpected weight gain and unexpected weight loss.   HENT: Negative for congestion, hearing loss, postnasal drip, sinus pressure, sneezing, sore throat and tinnitus.    Eyes: Negative for photophobia, pain and visual disturbance.   Respiratory: Negative for cough, chest tightness, shortness of breath and wheezing.   "  Cardiovascular: Negative for chest pain, palpitations and leg swelling.   Gastrointestinal: Negative for abdominal distention, abdominal pain, constipation, diarrhea, nausea and vomiting.   Endocrine: Negative for polydipsia, polyphagia and polyuria.   Genitourinary: Negative for dysuria, frequency, hematuria and urgency.   Musculoskeletal: Positive for myalgias (  Right side following fall).   Neurological: Negative for dizziness, weakness, numbness and headache.   All other systems reviewed and are negative.      Objective    /73 (BP Location: Left arm, Patient Position: Sitting, Cuff Size: Adult)   Pulse 96   Temp 97.4 °F (36.3 °C) (Oral)   Resp 16   Ht 172.7 cm (68\")   Wt 88.2 kg (194 lb 6.4 oz)   LMP  (LMP Unknown)   SpO2 98%   BMI 29.56 kg/m²     Physical Exam   Constitutional: She is oriented to person, place, and time. She appears well-developed and well-nourished. No distress.   HENT:   Head: Normocephalic and atraumatic.   Right Ear: External ear normal.   Left Ear: External ear normal.   Nose: Nose normal.   Mouth/Throat: Oropharynx is clear and moist.   Eyes: EOM are normal. Pupils are equal, round, and reactive to light.   Neck: Normal range of motion. Neck supple.   Cardiovascular: Normal rate, regular rhythm, normal heart sounds and intact distal pulses. Exam reveals no gallop and no friction rub.   No murmur heard.  Posterior tib and pedal pulses 2+ and equal bilaterally.  No peripheral pitting edema.   Pulmonary/Chest: Effort normal and breath sounds normal. No stridor. No respiratory distress. She has no wheezes. She has no rales. She exhibits bony tenderness. She exhibits no mass, no tenderness, no edema and no swelling.   Lungs are CTA bilaterally.  Slight tenderness to palpation of right upper rib cage.  No focal redness or erythema noted.       Abdominal: Soft. Bowel sounds are normal. She exhibits no distension. There is no tenderness.   Bowel sounds active x4 quadrants.  No " pain to light or deep palpation x4 quadrants.   Musculoskeletal: Normal range of motion.   Neurological: She is alert and oriented to person, place, and time.   Skin: Skin is warm and dry. Capillary refill takes less than 2 seconds. She is not diaphoretic.   Psychiatric: She has a normal mood and affect. Her behavior is normal. Judgment and thought content normal.   Nursing note and vitals reviewed.    Current outpatient and discharge medications have been reconciled for the patient.  Reviewed by: PARKER Hebert      Assessment/Plan   Diagnoses and all orders for this visit:    Healthcare maintenance  -     Pneumococcal Conjugate Vaccine 13-Valent All (PCV13)    Need for vaccination against Streptococcus pneumoniae using pneumococcal conjugate vaccine 13  -     Pneumococcal Conjugate Vaccine 13-Valent All (PCV13)    Mixed hyperlipidemia    Gastroesophageal reflux disease, esophagitis presence not specified    Fall, initial encounter      -Healthcare maintenance: We discussed colonoscopy and she defers at this time.  She is due for Prevnar 13 which we will administer.  Discussed the Shingrix vaccine and she will think about this and pursue it at her pharmacy if she decides to get it.    -Hyperlipidemia: Recent lipid panel was reviewed and improved from previous.  She does not wish to start a statin today and as her lipid panel is improved with dietary modifications, will continue with dietary modifications and exercise at this time and recheck in 6 months with Dr. Mccrary.    -GERD: Well-controlled with omeprazole, continue current therapy.    -Fall: She is having some right upper rib pain following the fall.  She does not wish to pursue x-rays or imaging at this time.  She is not having any impaired breathing.  We will have her try a heat pad, anti-inflammatories and rest.  She states that she has been doing a lot of deep cleaning since the fall this may be impairing the healing process and exacerbating the muscle  pain.  If symptoms persist or worsen in 7 to 10 days, she will let me know we will get an x-ray of the rib cage, right side for further evaluation.    -We will have patient back in 6 months with PCP, Dr. zaidi for routine health maintenance and follow-up on chronic conditions.  Follow-up PRN in the meantime.

## 2019-09-24 DIAGNOSIS — K21.9 GASTROESOPHAGEAL REFLUX DISEASE, ESOPHAGITIS PRESENCE NOT SPECIFIED: ICD-10-CM

## 2019-09-27 RX ORDER — OMEPRAZOLE 40 MG/1
CAPSULE, DELAYED RELEASE ORAL
Qty: 30 CAPSULE | Refills: 10 | OUTPATIENT
Start: 2019-09-27

## 2019-09-30 DIAGNOSIS — K21.9 GASTROESOPHAGEAL REFLUX DISEASE, ESOPHAGITIS PRESENCE NOT SPECIFIED: ICD-10-CM

## 2019-09-30 RX ORDER — OMEPRAZOLE 40 MG/1
CAPSULE, DELAYED RELEASE ORAL
Qty: 30 CAPSULE | Refills: 2 | Status: SHIPPED | OUTPATIENT
Start: 2019-09-30 | End: 2019-12-27

## 2019-12-27 DIAGNOSIS — K21.9 GASTROESOPHAGEAL REFLUX DISEASE, ESOPHAGITIS PRESENCE NOT SPECIFIED: ICD-10-CM

## 2019-12-27 RX ORDER — OMEPRAZOLE 40 MG/1
CAPSULE, DELAYED RELEASE ORAL
Qty: 30 CAPSULE | Refills: 1 | Status: SHIPPED | OUTPATIENT
Start: 2019-12-27 | End: 2020-01-15 | Stop reason: SDUPTHER

## 2020-01-14 DIAGNOSIS — E78.2 MIXED HYPERLIPIDEMIA: Primary | ICD-10-CM

## 2020-01-14 DIAGNOSIS — K21.9 GASTROESOPHAGEAL REFLUX DISEASE, ESOPHAGITIS PRESENCE NOT SPECIFIED: ICD-10-CM

## 2020-01-15 ENCOUNTER — OFFICE VISIT (OUTPATIENT)
Dept: GASTROENTEROLOGY | Facility: CLINIC | Age: 70
End: 2020-01-15

## 2020-01-15 ENCOUNTER — TELEPHONE (OUTPATIENT)
Dept: GASTROENTEROLOGY | Facility: CLINIC | Age: 70
End: 2020-01-15

## 2020-01-15 VITALS
BODY MASS INDEX: 29.19 KG/M2 | WEIGHT: 192.6 LBS | HEIGHT: 68 IN | SYSTOLIC BLOOD PRESSURE: 122 MMHG | DIASTOLIC BLOOD PRESSURE: 80 MMHG | TEMPERATURE: 97.9 F

## 2020-01-15 DIAGNOSIS — K21.9 GASTROESOPHAGEAL REFLUX DISEASE, ESOPHAGITIS PRESENCE NOT SPECIFIED: ICD-10-CM

## 2020-01-15 DIAGNOSIS — Z12.11 ENCOUNTER FOR SCREENING FOR MALIGNANT NEOPLASM OF COLON: Primary | ICD-10-CM

## 2020-01-15 DIAGNOSIS — K76.0 FATTY LIVER: ICD-10-CM

## 2020-01-15 LAB
ALBUMIN SERPL-MCNC: 5.1 G/DL (ref 3.5–5.2)
ALBUMIN/GLOB SERPL: 2 G/DL
ALP SERPL-CCNC: 73 U/L (ref 39–117)
ALT SERPL-CCNC: 32 U/L (ref 1–33)
AST SERPL-CCNC: 31 U/L (ref 1–32)
BILIRUB SERPL-MCNC: 0.6 MG/DL (ref 0.2–1.2)
BUN SERPL-MCNC: 12 MG/DL (ref 8–23)
BUN/CREAT SERPL: 14.3 (ref 7–25)
CALCIUM SERPL-MCNC: 10.1 MG/DL (ref 8.6–10.5)
CHLORIDE SERPL-SCNC: 98 MMOL/L (ref 98–107)
CHOLEST SERPL-MCNC: 225 MG/DL (ref 0–200)
CHOLEST/HDLC SERPL: 4.09 {RATIO}
CO2 SERPL-SCNC: 26.2 MMOL/L (ref 22–29)
CREAT SERPL-MCNC: 0.84 MG/DL (ref 0.57–1)
GLOBULIN SER CALC-MCNC: 2.5 GM/DL
GLUCOSE SERPL-MCNC: 102 MG/DL (ref 65–99)
HDLC SERPL-MCNC: 55 MG/DL (ref 40–60)
LDLC SERPL CALC-MCNC: 140 MG/DL (ref 0–100)
POTASSIUM SERPL-SCNC: 4.6 MMOL/L (ref 3.5–5.2)
PROT SERPL-MCNC: 7.6 G/DL (ref 6–8.5)
SODIUM SERPL-SCNC: 138 MMOL/L (ref 136–145)
TRIGL SERPL-MCNC: 150 MG/DL (ref 0–150)
VLDLC SERPL CALC-MCNC: 30 MG/DL

## 2020-01-15 PROCEDURE — 99214 OFFICE O/P EST MOD 30 MIN: CPT | Performed by: INTERNAL MEDICINE

## 2020-01-15 RX ORDER — OMEPRAZOLE 40 MG/1
40 CAPSULE, DELAYED RELEASE ORAL DAILY
Qty: 90 CAPSULE | Refills: 3 | Status: SHIPPED | OUTPATIENT
Start: 2020-01-15 | End: 2021-02-24 | Stop reason: SDUPTHER

## 2020-01-15 NOTE — TELEPHONE ENCOUNTER
Completed requisition faxed to [employer] @ [phone] - confirmation received.  See media tab.

## 2020-01-15 NOTE — PROGRESS NOTES
Chief Complaint   Patient presents with   • Heartburn       History of Present Illness:   69 y.o. female who has a history of gastroesophageal reflux disease, history of diverticulitis and underwent a partial colectomy, and has fatty liver.  This is a one-year follow-up visit. She had partial colectomy in 2010 for diverticular disease. ColoGuard was negative 2-3 yrs ago. We reviewed the results of the 7/13 CT abd/pelvis which showed an internal hernia of the sigmoid sigmoid mesocolon. Her last colonoscopy was in 2006. To have labs today.     Past Medical History:   Diagnosis Date   • Allergic    • Back pain     left when sleep on stomach  chronic   • Chicken pox     childhood   • Condition not found     VASOMOTOR EPISODES   • Constipation     chronic   • Decreased visual acuity    • Diverticulitis    • Diverticulosis    • Eye pain     have been having probelsm with left eye   • Fatigue     Fatigue / loss of energy   • Flushing    • GERD (gastroesophageal reflux disease)     resolved with omeprazole   • Hayfever    • Heartburn    • Hemorrhoids     25 years ago   • Hernia 1999   • Herpes    • History of CT scan of abdomen 07/30/2013   • History of EKG     10/26/2015 NORMAL / NO CHANGE  10/09/14   • History of mammogram     Date of last mammogram 09/2015. Mammogram Normal.  05/01/2014 DR MANZANARES   • History of Papanicolaou smear of cervix     Date of last PAP test: 09/2015. Pap Normal.   • History of TB skin testing 10/09/2014    TB Skin Test   • Hyperlipidemia    • Measles     childhood   • Melanoma (CMS/HCC)     right leg   • Mumps     childhood   • Night sweats    • Pregnancy     Number of Pregnancies: 3.  Number of Miscarriages: 1.  Number of Live Births: 2.   • Sinus trouble    • Skin melanoma (CMS/HCC) 03/2014    Skin cancer - melanoma - 3/2014   • Sleep trouble     Sleep problems has hot flashes, night sweats   • Visual impairment    • Weight gain        Past Surgical History:   Procedure Laterality Date   •  COLON RESECTION  12/15/2010    Event: laparoscopic sigmoid colectomy  Provider: Elizabeth Rudd MD   • COLONOSCOPY  08/19/2006    multiple sigm tics, random path neg   • COLONOSCOPY  2006   • ENDOSCOPY  08/19/2006    EGD-erythamatous gastropathy, neg celiac, neg h pylori   • FEMORAL HERNIA REPAIR  2000   • HEMORRHOIDECTOMY     • HERNIA REPAIR Right 1999    hernia in R thigh   • OTHER SURGICAL HISTORY Right     MELANOMA RIGHT LEG   • PAP SMEAR  05/01/2014    DR MANZANARES         Current Outpatient Medications:   •  cetirizine (ZyrTEC) 10 MG tablet, Take 10 mg by mouth daily, Disp: , Rfl:   •  FINACEA 15 % foam, , Disp: , Rfl:   •  meloxicam (MOBIC) 7.5 MG tablet, Take 1 tablet by mouth 3 (Three) Times a Week., Disp: 30 tablet, Rfl: 2  •  omeprazole (priLOSEC) 40 MG capsule, TAKE ONE CAPSULE BY MOUTH DAILY, Disp: 30 capsule, Rfl: 1  •  PREMPRO 0.3-1.5 MG per tablet, Take 1 tablet by mouth Daily., Disp: , Rfl:     Allergies   Allergen Reactions   • Doxycycline Itching       Family History   Problem Relation Age of Onset   • Arthritis Father         osteoarthritis   • Cancer Father         esophageal   • Hypertension Father    • Ulcers Father         gastric   • Glaucoma Father         also macular degeneration   • Alcohol abuse Father    • Colon polyps Father         colon polyps but no cancer   • Arthritis Paternal Grandmother         rheumatoid   • Alcohol abuse Paternal Uncle    • Breast cancer Paternal Aunt    • Arthritis Maternal Grandmother        Social History     Socioeconomic History   • Marital status:      Spouse name: Not on file   • Number of children: Not on file   • Years of education: Not on file   • Highest education level: Not on file   Tobacco Use   • Smoking status: Never Smoker   • Smokeless tobacco: Never Used   Substance and Sexual Activity   • Alcohol use: Yes     Alcohol/week: 10.0 standard drinks     Types: 10 Glasses of wine per week   • Drug use: No   • Sexual activity: Defer        Review of Systems   Gastrointestinal: Negative for abdominal distention and abdominal pain.     Pertinent positives and negatives documented in the HPI and all other systems reviewed and were found to be negative.  Vitals:    01/15/20 0851   BP: 122/80   Temp: 97.9 °F (36.6 °C)       Physical Exam   Constitutional: She is oriented to person, place, and time. She appears well-developed and well-nourished. No distress.   HENT:   Head: Normocephalic and atraumatic. Hair is normal.   Right Ear: Hearing, tympanic membrane, external ear and ear canal normal. No drainage. No decreased hearing is noted.   Left Ear: Hearing, tympanic membrane, external ear and ear canal normal. No decreased hearing is noted.   Nose: No nasal deformity.   Mouth/Throat: Oropharynx is clear and moist.   Eyes: Pupils are equal, round, and reactive to light. Conjunctivae, EOM and lids are normal. Right eye exhibits no discharge. Left eye exhibits no discharge.   Neck: Normal range of motion. Neck supple. No JVD present. No tracheal deviation present. No thyromegaly present.   Cardiovascular: Normal rate, regular rhythm, normal heart sounds, intact distal pulses and normal pulses. Exam reveals no gallop and no friction rub.   No murmur heard.  Pulmonary/Chest: Effort normal and breath sounds normal. No respiratory distress. She has no wheezes. She has no rales. She exhibits no tenderness.   Abdominal: Soft. Bowel sounds are normal. She exhibits no distension and no mass. There is no tenderness. There is no rebound and no guarding. No hernia.   Genitourinary: Rectal exam shows guaiac negative stool.   Musculoskeletal: Normal range of motion. She exhibits no edema, tenderness or deformity.   Lymphadenopathy:     She has no cervical adenopathy.   Neurological: She is alert and oriented to person, place, and time. She has normal reflexes. She displays normal reflexes. No cranial nerve deficit. She exhibits normal muscle tone. Coordination  normal.   Skin: Skin is warm and dry. No rash noted. She is not diaphoretic. No erythema.   Psychiatric: She has a normal mood and affect. Her behavior is normal. Judgment and thought content normal.   Vitals reviewed.      Sosa was seen today for heartburn.    Diagnoses and all orders for this visit:    Encounter for screening for malignant neoplasm of colon  -     Cologuard - Stool, Per Rectum    Gastroesophageal reflux disease, esophagitis presence not specified    Fatty liver      Assessment:  1.  Gastroesophageal reflux disease.  2.  History of diverticulitis and had a partial colectomy in the past.  3.  History of fatty liver.  4. 7/13 CT abd/pelvis which showed an internal hernia of the sigmoid sigmoid mesocolon    Recommendations:  1. She doesn't want a BE or colonoscopy now. We will do a ColoGuard instead.   2. Continue Omeprazole 40 mg/day.   3. F/u one year    No follow-ups on file.    Vincent Greenwood MD  1/15/2020

## 2020-01-22 ENCOUNTER — OFFICE VISIT (OUTPATIENT)
Dept: INTERNAL MEDICINE | Facility: CLINIC | Age: 70
End: 2020-01-22

## 2020-01-22 VITALS
OXYGEN SATURATION: 96 % | DIASTOLIC BLOOD PRESSURE: 80 MMHG | BODY MASS INDEX: 29.65 KG/M2 | WEIGHT: 195 LBS | TEMPERATURE: 98.5 F | SYSTOLIC BLOOD PRESSURE: 128 MMHG | HEART RATE: 85 BPM

## 2020-01-22 DIAGNOSIS — E78.00 HYPERCHOLESTEREMIA: ICD-10-CM

## 2020-01-22 DIAGNOSIS — M46.97 UNSPECIFIED INFLAMMATORY SPONDYLOPATHY, LUMBOSACRAL REGION (HCC): ICD-10-CM

## 2020-01-22 DIAGNOSIS — H10.9 CONJUNCTIVITIS OF BOTH EYES, UNSPECIFIED CONJUNCTIVITIS TYPE: Primary | ICD-10-CM

## 2020-01-22 PROCEDURE — 99214 OFFICE O/P EST MOD 30 MIN: CPT | Performed by: FAMILY MEDICINE

## 2020-01-22 RX ORDER — GENTAMICIN SULFATE 3 MG/ML
1 SOLUTION/ DROPS OPHTHALMIC 4 TIMES DAILY
Qty: 5 ML | Refills: 0 | Status: SHIPPED | OUTPATIENT
Start: 2020-01-22 | End: 2020-05-18

## 2020-01-22 RX ORDER — MELOXICAM 7.5 MG/1
7.5 TABLET ORAL 3 TIMES WEEKLY
Qty: 30 TABLET | Refills: 5 | Status: SHIPPED | OUTPATIENT
Start: 2020-01-22 | End: 2020-05-18

## 2020-01-22 RX ORDER — AZELAIC ACID 0.15 G/G
GEL TOPICAL
COMMUNITY
Start: 2019-12-16 | End: 2020-07-22

## 2020-01-22 NOTE — PROGRESS NOTES
Subjective   Sosa Diamond is a 69 y.o. female.     Chief Complaint   Patient presents with   • Pain   • Back Pain   • Leg Pain   • Hyperlipidemia         History of Present Illness   Very nice lady with a history of evidence of sciatica involving the right leg which is intermittent but mainly a lot of stiffness when she gets up to move.  She has a lot of freezing up as far as lumbar spine.  I am wondering if she has evidence of ankylosing spondylitis with inflammatory component versus polymyalgia rheumatica.  Labs will be drawn in reference to these entities including HLA-B27 rheumatoid panel sed rate.    Otherwise she has hypercholesterolemia her and she does not wish to take a statin at this point.  Low-fat diet and review again the summer.    She has bilateral eye irritation since yesterday which appears to be allergic or irritant.  Does not seem to be viral.      The following portions of the patient's history were reviewed and updated as appropriate: allergies, current medications, past social history and problem list.    Review of Systems   Constitutional: Negative.    HENT: Negative.    Eyes: Positive for discharge, redness and itching.   Respiratory: Negative.    Cardiovascular: Negative.    Gastrointestinal: Negative.    Endocrine: Negative.    Genitourinary: Negative.    Musculoskeletal: Positive for arthralgias, back pain and myalgias.   Skin: Negative.    Allergic/Immunologic: Negative.    Neurological: Negative.    Hematological: Negative.    Psychiatric/Behavioral: Negative.        Objective   Vitals:    01/22/20 1047   BP: 128/80   Pulse: 85   Temp: 98.5 °F (36.9 °C)   SpO2: 96%     Physical Exam    Assessment/Plan   Problem List Items Addressed This Visit        Musculoskeletal and Integument    Unspecified inflammatory spondylopathy, lumbosacral region (CMS/HCC)    Relevant Orders    HLA-B27 Antigen    Rheumatoid Arthritis Expanded Panel    CBC & Differential    Comprehensive Metabolic Panel     Lipid Panel With / Chol / HDL Ratio    TSH    T4, Free    Sedimentation Rate    Urinalysis With Microscopic If Indicated (No Culture) - Urine, Clean Catch    Sedimentation Rate      Other Visit Diagnoses     Conjunctivitis of both eyes, unspecified conjunctivitis type    -  Primary    Relevant Medications    gentamicin (GARAMYCIN) 0.3 % ophthalmic solution    Hypercholesteremia        Relevant Orders    CBC & Differential    Comprehensive Metabolic Panel    Lipid Panel With / Chol / HDL Ratio    TSH    T4, Free    Sedimentation Rate    Urinalysis With Microscopic If Indicated (No Culture) - Urine, Clean Catch      Plan: Labs pending repeat labs in the summer if HLA-B27 positive consider rheumatology referral.  Gentamicin ophthalmic solution 1 drop each eye 4 times daily for 5 to 7 days.

## 2020-01-22 NOTE — PATIENT INSTRUCTIONS
Ankylosing Spondylitis, Adult    Ankylosing spondylitis (AS) is a long-term (chronic) condition that causes swelling and irritation (inflammation), often in the spine. Over time, the inflammation can make new bone form in the spine. This can result in the spinal joints fusing together (ankylosis) and loss of movement. In some people, inflammation may affect other areas of the body, such as the shoulders, hips, ribs, and small joints in the hands and feet. Sometimes the eyes are affected. Inflammation can also happen in the joints of the back and in the tissues that connect joints to bones (ligaments) or connect muscles to bones (tendons).  As the disease gets worse, the joints that connect the spine to the pelvis (sacroiliac or SIjoints) are often affected. The condition can range from mild to severe. You may have more severe AS if you smoke.  What are the causes?  The exact cause of this condition is not known. It may be caused by abnormal genes that are passed down through families. The most common gene that has been linked to AS produces a protein called HLA-B27. Even if you have genes for AS, they may need to be triggered to become active. Infection may be one trigger.  What increases the risk?  You are more likely to develop this condition if:  · You have a family history of AS.  · You are between the ages of 17 and 45.  · You are male.  · You have frequent gastrointestinal infections.  What are the signs or symptoms?  The most common symptoms are pain and stiffness that get worse with rest and better with movement. Pain may be worse at night, and stiffness may be worse in the morning. Symptoms also depend on where the inflammation occurs:  · Inflammation of the SI joints causes pain in the lower back and buttocks. You may also feel pain in your hips.  · Inflammation in the upper spine, neck, and ribs causes pain in those areas. Rib inflammation may cause shortness of breath.  · Inflammation in the shoulder,  fingers, knees, ankles, toes, or heels may cause pain and stiffness in those areas.  · Inflammation in the eyes may cause eye pain, redness, or visual disturbances.  · Generalized inflammation may cause fever, loss of appetite, and fatigue.  How is this diagnosed?  This condition may be diagnosed based on:  · Your symptoms and your medical and family history.  · A physical exam.  · Tests, such as:  ? X-rays or an MRI to look for joint changes or inflammation.  ? A blood test to check for the protein HLA-B27.  You may be referred to a health care provider who specializes in diseases of the bones, muscles, and joints (rheumatologist).  How is this treated?  There is no cure for this condition, but treatment can reduce symptoms. Treatment options may include:  · Medicines, such as:  ? Nonsteroidal anti-inflammatory drugs (NSAIDs). These may be used first to reduce pain and inflammation.  ? Steroid shots into inflamed joints to help reverse inflammation.  ? Disease-modifying antirheumatic drugs (DMARDs). These may reduce symptoms and slow the progression of the disease.  ? Biologic medicines. These may be used for moderate to severe forms of AS and when other treatments are not helping. These medicines are the most effective but may increase the risk for a serious infection.  · Physical therapy and physical activity to help keep muscles strong and keep joints from getting stiff.  · Surgery may be needed for severe joint damage, usually in the hip. This may require hip replacement surgery.  Follow these instructions at home:  Activity  · Return to your normal activities as told by your health care provider. Ask your health care provider what activities are safe for you.  · Try to get exercise every day. Exercise is very important when you have AS. If you have learned physical therapy exercises, practice them as told by your physical therapist.  · Take care to avoid falls. When appropriate, use a cane or walker. Remove  area rugs from your home.  General instructions  · Take over-the-counter and prescription medicines only as told by your health care provider.  · Maintain good posture when standing and sitting.  · Maintain a healthy weight.  · Wear shoes that are supportive and well fitting.  · Do not use any products that contain nicotine or tobacco, such as cigarettes and e-cigarettes. These can make your condition worse. If you need help quitting, ask your health care provider.  · Keep all follow-up visits as told by your health care provider. This is important.  Where to find more information  Learn as much as you can about AS. You can find support and information from the [employer]ond[employer] of [place]: www.spondylitis.org  Contact a health care provider if:  · You are on a biologic medicine and you have a fever or any other signs of infection.  · You have side effects from any of your medicines.  · Your symptoms are not improving with medicine or are getting worse.  Get help right away if:  · You have trouble breathing.  · You have a sudden change in vision.  Summary  · Ankylosing spondylitis (AS) is a long-term condition that causes inflammation, often in the spine.  · AS may be caused by abnormal genes that can be passed down through families.  · The most common symptoms of AS are pain and stiffness that get worse with rest and better with movement.  · There is no cure for AS, but treatment can control symptoms and slow progression of the disease.  · Nonsteroidal anti-inflammatory drugs (NSAIDs) may be the first treatment used. These help reduce pain and inflammation.  This information is not intended to replace advice given to you by your health care provider. Make sure you discuss any questions you have with your health care provider.  Document Released: 02/20/2019 Document Revised: 02/20/2019 Document Reviewed: 02/20/2019  [employer] Interactive Patient Education © 2019 [employer] Inc.

## 2020-01-27 LAB
CCP IGA+IGG SERPL IA-ACNC: 10 UNITS (ref 0–19)
CRP SERPL-MCNC: 6 MG/L (ref 0–10)
ERYTHROCYTE [SEDIMENTATION RATE] IN BLOOD BY WESTERGREN METHOD: 3 MM/HR (ref 0–40)
HLA-B27 QL NAA+PROBE: NEGATIVE
RHEUMATOID FACT SERPL-ACNC: 13.2 IU/ML (ref 0–13.9)

## 2020-01-27 NOTE — PROGRESS NOTES
"01/27/20  Tell her that the ColoGuard done 1/21/2020 came back positive. This means that you should have a colonoscopy. Because of the \"internal hernia\" found on the CT abd/pelvis I would recommend that she get an air contrast barium enema first to see if anything is found and do this to avoid the colonoscopy (given that the CT abd showed an \"internal hernia\". If she is OK with this then please schedule her for an ACBE and I will cosign. Also have her get a CBC because I don't see a recent one done on her. thx.kjh  Thx. kjh"

## 2020-01-28 ENCOUNTER — TELEPHONE (OUTPATIENT)
Dept: GASTROENTEROLOGY | Facility: CLINIC | Age: 70
End: 2020-01-28

## 2020-01-28 DIAGNOSIS — K45.8 INTERNAL HERNIA: ICD-10-CM

## 2020-01-28 DIAGNOSIS — R19.5 POSITIVE COLORECTAL CANCER SCREENING USING COLOGUARD TEST: Primary | ICD-10-CM

## 2020-01-28 NOTE — TELEPHONE ENCOUNTER
"----- Message from Vincent Greenwood MD sent at 1/27/2020  5:41 PM EST -----  01/27/20  Tell her that the ColoGuard done 1/21/2020 came back positive. This means that you should have a colonoscopy. Because of the \"internal hernia\" found on the CT abd/pelvis I would recommend that she get an air contrast barium enema first to see if anything is found and do this to avoid the colonoscopy (given that the CT abd showed an \"internal hernia\". If she is OK with this then please schedule her for an ACBE and I will cosign. Also have her get a CBC because I don't see a recent one done on her. thx.kj  Thx. kj      --Called pt and advised of the above.  Pt verb understanding and is agreeable to have barium enema and cbc.  Advised pt someone from [facility] will call her to arrange.  Pt will get cbc done when she has barium enema.  Cbc and barium enema ordered. Update sent to Dr Greenwood.   Tracy Celeste RN  "

## 2020-01-29 ENCOUNTER — TELEPHONE (OUTPATIENT)
Dept: INTERNAL MEDICINE | Facility: CLINIC | Age: 70
End: 2020-01-29

## 2020-02-11 ENCOUNTER — HOSPITAL ENCOUNTER (OUTPATIENT)
Dept: GENERAL RADIOLOGY | Facility: HOSPITAL | Age: 70
Discharge: HOME | End: 2020-02-11
Admitting: INTERNAL MEDICINE

## 2020-02-11 ENCOUNTER — APPOINTMENT (OUTPATIENT)
Dept: LAB | Facility: HOSPITAL | Age: 70
End: 2020-02-11

## 2020-02-11 DIAGNOSIS — K45.8 INTERNAL HERNIA: ICD-10-CM

## 2020-02-11 DIAGNOSIS — R19.5 POSITIVE COLORECTAL CANCER SCREENING USING COLOGUARD TEST: ICD-10-CM

## 2020-02-11 LAB
BASOPHILS # BLD AUTO: 0.03 10*3/MM3 (ref 0–0.2)
BASOPHILS NFR BLD AUTO: 0.5 % (ref 0–1.5)
DEPRECATED RDW RBC AUTO: 44.1 FL (ref 37–54)
EOSINOPHIL # BLD AUTO: 0.06 10*3/MM3 (ref 0–0.4)
EOSINOPHIL NFR BLD AUTO: 0.9 % (ref 0.3–6.2)
ERYTHROCYTE [DISTWIDTH] IN BLOOD BY AUTOMATED COUNT: 13 % (ref 12.3–15.4)
HCT VFR BLD AUTO: 43.8 % (ref 34–46.6)
HGB BLD-MCNC: 15 G/DL (ref 12–15.9)
IMM GRANULOCYTES # BLD AUTO: 0.01 10*3/MM3 (ref 0–0.05)
IMM GRANULOCYTES NFR BLD AUTO: 0.2 % (ref 0–0.5)
LYMPHOCYTES # BLD AUTO: 1.75 10*3/MM3 (ref 0.7–3.1)
LYMPHOCYTES NFR BLD AUTO: 26.3 % (ref 19.6–45.3)
MCH RBC QN AUTO: 32 PG (ref 26.6–33)
MCHC RBC AUTO-ENTMCNC: 34.2 G/DL (ref 31.5–35.7)
MCV RBC AUTO: 93.4 FL (ref 79–97)
MONOCYTES # BLD AUTO: 0.53 10*3/MM3 (ref 0.1–0.9)
MONOCYTES NFR BLD AUTO: 8 % (ref 5–12)
NEUTROPHILS # BLD AUTO: 4.28 10*3/MM3 (ref 1.7–7)
NEUTROPHILS NFR BLD AUTO: 64.1 % (ref 42.7–76)
NRBC BLD AUTO-RTO: 0 /100 WBC (ref 0–0.2)
PLATELET # BLD AUTO: 312 10*3/MM3 (ref 140–450)
PMV BLD AUTO: 8.5 FL (ref 6–12)
RBC # BLD AUTO: 4.69 10*6/MM3 (ref 3.77–5.28)
WBC NRBC COR # BLD: 6.66 10*3/MM3 (ref 3.4–10.8)

## 2020-02-11 PROCEDURE — 74270 X-RAY XM COLON 1CNTRST STD: CPT

## 2020-02-11 PROCEDURE — 36415 COLL VENOUS BLD VENIPUNCTURE: CPT

## 2020-02-11 PROCEDURE — 85025 COMPLETE CBC W/AUTO DIFF WBC: CPT | Performed by: INTERNAL MEDICINE

## 2020-02-13 ENCOUNTER — PREP FOR SURGERY (OUTPATIENT)
Dept: OTHER | Facility: HOSPITAL | Age: 70
End: 2020-02-13

## 2020-02-13 ENCOUNTER — TELEPHONE (OUTPATIENT)
Dept: GASTROENTEROLOGY | Facility: CLINIC | Age: 70
End: 2020-02-13

## 2020-02-13 DIAGNOSIS — R19.5 POSITIVE COLORECTAL CANCER SCREENING USING COLOGUARD TEST: Primary | ICD-10-CM

## 2020-02-13 NOTE — PROGRESS NOTES
02/13/20  I called her and reviewed the results of her barium enema.  Talked about the pros and cons of doing a colonoscopy in someone who has a positive Cologuard test.  I told her I would recommend that she have a colonoscopy because of her positive Cologuard test.  Please fax a copy of this report to her PCP.  Thx. kjh

## 2020-02-17 ENCOUNTER — TELEPHONE (OUTPATIENT)
Dept: GASTROENTEROLOGY | Facility: CLINIC | Age: 70
End: 2020-02-17

## 2020-02-17 NOTE — TELEPHONE ENCOUNTER
----- Message from Vincent Greenwood MD sent at 2/13/2020  8:09 AM EST -----  02/13/20  I called her and reviewed the results of her barium enema.  Talked about the pros and cons of doing a colonoscopy in someone who has a positive Cologuard test.  I told her I would recommend that she have a colonoscopy because of her positive Cologuard test.  Please fax a copy of this report to her PCP.  Thx. kjh    **Message to Dr Zaheer Mccrary.  Alondra Webb RN.

## 2020-05-17 ENCOUNTER — TELEMEDICINE (OUTPATIENT)
Dept: FAMILY MEDICINE CLINIC | Facility: TELEHEALTH | Age: 70
End: 2020-05-17

## 2020-05-17 DIAGNOSIS — L60.0 INGROWN LEFT BIG TOENAIL: Primary | ICD-10-CM

## 2020-05-17 PROCEDURE — 99213 OFFICE O/P EST LOW 20 MIN: CPT | Performed by: NURSE PRACTITIONER

## 2020-05-17 RX ORDER — SULFAMETHOXAZOLE AND TRIMETHOPRIM 800; 160 MG/1; MG/1
1 TABLET ORAL 2 TIMES DAILY
Qty: 14 TABLET | Refills: 0 | Status: SHIPPED | OUTPATIENT
Start: 2020-05-17 | End: 2020-05-18 | Stop reason: ALTCHOICE

## 2020-05-17 NOTE — PATIENT INSTRUCTIONS
Ingrown Toenail  An ingrown toenail occurs when the corner or sides of a toenail grow into the surrounding skin. This causes discomfort and pain. The big toe is most commonly affected, but any of the toes can be affected. If an ingrown toenail is not treated, it can become infected.  What are the causes?  This condition may be caused by:  · Wearing shoes that are too small or tight.  · An injury, such as stubbing your toe or having your toe stepped on.  · Improper cutting or care of your toenails.  · Having nail or foot abnormalities that were present from birth (congenital abnormalities), such as having a nail that is too big for your toe.  What increases the risk?  The following factors may make you more likely to develop ingrown toenails:  · Age. Nails tend to get thicker with age, so ingrown nails are more common among older people.  · Cutting your toenails incorrectly, such as cutting them very short or cutting them unevenly.  An ingrown toenail is more likely to get infected if you have:  · Diabetes.  · Blood flow (circulation) problems.  What are the signs or symptoms?  Symptoms of an ingrown toenail may include:  · Pain, soreness, or tenderness.  · Redness.  · Swelling.  · Hardening of the skin that surrounds the toenail.  Signs that an ingrown toenail may be infected include:  · Fluid or pus.  · Symptoms that get worse instead of better.  How is this diagnosed?  An ingrown toenail may be diagnosed based on your medical history, your symptoms, and a physical exam. If you have fluid or blood coming from your toenail, a sample may be collected to test for the specific type of bacteria that is causing the infection.  How is this treated?  Treatment depends on how severe your ingrown toenail is. You may be able to care for your toenail at home.  · If you have an infection, you may be prescribed antibiotic medicines.  · If you have fluid or pus draining from your toenail, your health care provider may drain  it.  · If you have trouble walking, you may be given crutches to use.  · If you have a severe or infected ingrown toenail, you may need a procedure to remove part or all of the nail.  Follow these instructions at home:  Foot care    · Do not pick at your toenail or try to remove it yourself.  · Soak your foot in warm, soapy water. Do this for 20 minutes, 3 times a day, or as often as told by your health care provider. This helps to keep your toe clean and keep your skin soft.  · Wear shoes that fit well and are not too tight. Your health care provider may recommend that you wear open-toed shoes while you heal.  · Trim your toenails regularly and carefully. Cut your toenails straight across to prevent injury to the skin at the corners of the toenail. Do not cut your nails in a curved shape.  · Keep your feet clean and dry to help prevent infection.  Medicines  · Take over-the-counter and prescription medicines only as told by your health care provider.  · If you were prescribed an antibiotic, take it as told by your health care provider. Do not stop taking the antibiotic even if you start to feel better.  Activity  · Return to your normal activities as told by your health care provider. Ask your health care provider what activities are safe for you.  · Avoid activities that cause pain.  General instructions  · If your health care provider told you to use crutches to help you move around, use them as instructed.  · Keep all follow-up visits as told by your health care provider. This is important.  Contact a health care provider if:  · You have more redness, swelling, pain, or other symptoms that do not improve with treatment.  · You have fluid, blood, or pus coming from your toenail.  Get help right away if:  · You have a red streak on your skin that starts at your foot and spreads up your leg.  · You have a fever.  Summary  · An ingrown toenail occurs when the corner or sides of a toenail grow into the surrounding  skin. This causes discomfort and pain. The big toe is most commonly affected, but any of the toes can be affected.  · If an ingrown toenail is not treated, it can become infected.  · Fluid or pus draining from your toenail is a sign of infection. Your health care provider may need to drain it. You may be given antibiotics to treat the infection.  · Trimming your toenails regularly and properly can help you prevent an ingrown toenail.  This information is not intended to replace advice given to you by your health care provider. Make sure you discuss any questions you have with your health care provider.  Document Released: 12/15/2001 Document Revised: 09/05/2018 Document Reviewed: 09/05/2018  [employer] Interactive Patient Education © 2020 Elsevier Inc.

## 2020-05-17 NOTE — PROGRESS NOTES
Subjective   Sosa Diamond is a 69 y.o. female seen by virtual visit for infected ingrown toenail.     Patient did her own pedicure and felt she may have cut her nail too short on the left big toe.  Also has a fungal infection in that nail.  As the nail has grown back, she feel it is ingrown, and in the last 2 days it has gotten red and tender and swollen on the medial edge of the left big toenail.  No redness or swelling down into the toe or up into the foot.  No fever.  She has been using warm soaks, cleaning with peroxide, and applying antibacterial ointment, but it is not helping.       The following portions of the patient's history were reviewed and updated as appropriate: allergies, current medications, past family history, past medical history, past social history, past surgical history and problem list.    Review of Systems   Constitutional: Negative for activity change, fatigue and fever.   HENT: Negative for sore throat.    Respiratory: Negative for cough.    Gastrointestinal: Negative for abdominal pain, diarrhea, nausea and vomiting.   Genitourinary: Negative for dysuria.   Musculoskeletal: Negative for joint swelling and myalgias.   Skin: Negative for rash.   Allergic/Immunologic: Negative for food allergies and immunocompromised state (not diabetic).       Objective   Physical Exam   Constitutional: She is oriented to person, place, and time. She appears well-developed and well-nourished. No distress.   HENT:   Head: Normocephalic and atraumatic.   Pulmonary/Chest: Effort normal. No respiratory distress.   Neurological: She is alert and oriented to person, place, and time.   Skin: She is not diaphoretic.   Psychiatric: She has a normal mood and affect. Her behavior is normal. Thought content normal.     I had her on the virtual screen at first, but as she was trying to move the camera to show me the toe we lost picture altogether and had to finish the visit by voice only.      Assessment/Plan   Sosa  was seen today for nail problem.    Diagnoses and all orders for this visit:    Ingrown left big toenail    Other orders  -     sulfamethoxazole-trimethoprim (Bactrim DS) 800-160 MG per tablet; Take 1 tablet by mouth 2 (Two) Times a Day.      Will give antibiotics and recommended that she continue soaks and ointment.  Speak with PCP about treatment for fungal component of nail infection.  Asked her to F/U with PCP over the next few days if not improving and right away if worse in any way.

## 2020-05-18 ENCOUNTER — TELEPHONE (OUTPATIENT)
Dept: INTERNAL MEDICINE | Facility: CLINIC | Age: 70
End: 2020-05-18

## 2020-05-18 RX ORDER — CEPHALEXIN 500 MG/1
500 CAPSULE ORAL 3 TIMES DAILY
Qty: 21 CAPSULE | Refills: 0 | Status: SHIPPED | OUTPATIENT
Start: 2020-05-18 | End: 2020-07-22

## 2020-05-18 NOTE — TELEPHONE ENCOUNTER
I talked to the patient and sent Keflex 500 3 times daily for 7 days with wound care instructions.  And she will call us back if she wishes see podiatry.

## 2020-05-18 NOTE — TELEPHONE ENCOUNTER
PATIENT WENT TO  YESTERDAY D/T AN INFECTED TOE, GOT TO THAT POINT BECAUSE OF AN INGROWN NAIL.   PATIENT WAS PRESCRIBED BACTRIM, BUT SINCE TOMORROW IS HER BIRTHDAY AND WOULD LIKE TO CELEBRATE AND HAVE AN ALCOHOLIC DRINK OR TWO, IS THERE ANY OTHER ANTIBIOTIC SHE CAN TAKE?    [facility] [address] [facility] [address] AT [facility] - [phone] [facility] [phone] FX     PLEASE ADVISE

## 2020-07-22 ENCOUNTER — OFFICE VISIT (OUTPATIENT)
Dept: INTERNAL MEDICINE | Facility: CLINIC | Age: 70
End: 2020-07-22

## 2020-07-22 VITALS
OXYGEN SATURATION: 100 % | SYSTOLIC BLOOD PRESSURE: 114 MMHG | HEART RATE: 75 BPM | HEIGHT: 68 IN | WEIGHT: 193 LBS | DIASTOLIC BLOOD PRESSURE: 62 MMHG | BODY MASS INDEX: 29.25 KG/M2

## 2020-07-22 DIAGNOSIS — G89.29 CHRONIC RIGHT-SIDED LOW BACK PAIN WITH SCIATICA, SCIATICA LATERALITY UNSPECIFIED: ICD-10-CM

## 2020-07-22 DIAGNOSIS — E78.2 MIXED HYPERLIPIDEMIA: Primary | ICD-10-CM

## 2020-07-22 DIAGNOSIS — M54.40 CHRONIC RIGHT-SIDED LOW BACK PAIN WITH SCIATICA, SCIATICA LATERALITY UNSPECIFIED: ICD-10-CM

## 2020-07-22 DIAGNOSIS — M47.816 FACET ARTHRITIS OF LUMBAR REGION: ICD-10-CM

## 2020-07-22 DIAGNOSIS — R19.5 POSITIVE COLORECTAL CANCER SCREENING USING COLOGUARD TEST: ICD-10-CM

## 2020-07-22 PROBLEM — E78.5 HYPERLIPIDEMIA: Status: ACTIVE | Noted: 2020-07-22

## 2020-07-22 PROBLEM — M54.9 BACK PAIN: Status: ACTIVE | Noted: 2020-07-22

## 2020-07-22 LAB
ALBUMIN SERPL-MCNC: 4.4 G/DL (ref 3.5–5.2)
ALBUMIN/GLOB SERPL: 2.2 G/DL
ALP SERPL-CCNC: 60 U/L (ref 39–117)
ALT SERPL-CCNC: 30 U/L (ref 1–33)
APPEARANCE UR: CLEAR
AST SERPL-CCNC: 27 U/L (ref 1–32)
BASOPHILS # BLD AUTO: 0.03 10*3/MM3 (ref 0–0.2)
BASOPHILS NFR BLD AUTO: 0.5 % (ref 0–1.5)
BILIRUB SERPL-MCNC: 0.4 MG/DL (ref 0–1.2)
BILIRUB UR QL STRIP: NEGATIVE
BUN SERPL-MCNC: 7 MG/DL (ref 8–23)
BUN/CREAT SERPL: 9 (ref 7–25)
CALCIUM SERPL-MCNC: 9.3 MG/DL (ref 8.6–10.5)
CHLORIDE SERPL-SCNC: 104 MMOL/L (ref 98–107)
CHOLEST SERPL-MCNC: 203 MG/DL (ref 0–200)
CHOLEST/HDLC SERPL: 4.23 {RATIO}
CO2 SERPL-SCNC: 26.3 MMOL/L (ref 22–29)
COLOR UR: YELLOW
CREAT SERPL-MCNC: 0.78 MG/DL (ref 0.57–1)
EOSINOPHIL # BLD AUTO: 0.06 10*3/MM3 (ref 0–0.4)
EOSINOPHIL NFR BLD AUTO: 1 % (ref 0.3–6.2)
ERYTHROCYTE [DISTWIDTH] IN BLOOD BY AUTOMATED COUNT: 12.8 % (ref 12.3–15.4)
ERYTHROCYTE [SEDIMENTATION RATE] IN BLOOD BY WESTERGREN METHOD: 4 MM/HR (ref 0–30)
GLOBULIN SER CALC-MCNC: 2 GM/DL
GLUCOSE SERPL-MCNC: 110 MG/DL (ref 65–99)
GLUCOSE UR QL: NEGATIVE
HCT VFR BLD AUTO: 38.1 % (ref 34–46.6)
HDLC SERPL-MCNC: 48 MG/DL (ref 40–60)
HGB BLD-MCNC: 13.4 G/DL (ref 12–15.9)
HGB UR QL STRIP: NEGATIVE
IMM GRANULOCYTES # BLD AUTO: 0.01 10*3/MM3 (ref 0–0.05)
IMM GRANULOCYTES NFR BLD AUTO: 0.2 % (ref 0–0.5)
KETONES UR QL STRIP: NEGATIVE
LDLC SERPL CALC-MCNC: 119 MG/DL (ref 0–100)
LEUKOCYTE ESTERASE UR QL STRIP: NEGATIVE
LYMPHOCYTES # BLD AUTO: 1.55 10*3/MM3 (ref 0.7–3.1)
LYMPHOCYTES NFR BLD AUTO: 26.7 % (ref 19.6–45.3)
MCH RBC QN AUTO: 33 PG (ref 26.6–33)
MCHC RBC AUTO-ENTMCNC: 35.2 G/DL (ref 31.5–35.7)
MCV RBC AUTO: 93.8 FL (ref 79–97)
MONOCYTES # BLD AUTO: 0.39 10*3/MM3 (ref 0.1–0.9)
MONOCYTES NFR BLD AUTO: 6.7 % (ref 5–12)
NEUTROPHILS # BLD AUTO: 3.76 10*3/MM3 (ref 1.7–7)
NEUTROPHILS NFR BLD AUTO: 64.9 % (ref 42.7–76)
NITRITE UR QL STRIP: NEGATIVE
NRBC BLD AUTO-RTO: 0 /100 WBC (ref 0–0.2)
PH UR STRIP: 6.5 [PH] (ref 5–8)
PLATELET # BLD AUTO: 292 10*3/MM3 (ref 140–450)
POTASSIUM SERPL-SCNC: 4.2 MMOL/L (ref 3.5–5.2)
PROT SERPL-MCNC: 6.4 G/DL (ref 6–8.5)
PROT UR QL STRIP: NEGATIVE
RBC # BLD AUTO: 4.06 10*6/MM3 (ref 3.77–5.28)
SODIUM SERPL-SCNC: 140 MMOL/L (ref 136–145)
SP GR UR: 1.01 (ref 1–1.03)
T4 FREE SERPL-MCNC: 0.93 NG/DL (ref 0.93–1.7)
TRIGL SERPL-MCNC: 180 MG/DL (ref 0–150)
TSH SERPL DL<=0.005 MIU/L-ACNC: 2.34 UIU/ML (ref 0.27–4.2)
UROBILINOGEN UR STRIP-MCNC: NORMAL MG/DL
VIT B12 SERPL-MCNC: 363 PG/ML (ref 211–946)
VLDLC SERPL CALC-MCNC: 36 MG/DL
WBC # BLD AUTO: 5.8 10*3/MM3 (ref 3.4–10.8)

## 2020-07-22 PROCEDURE — 99213 OFFICE O/P EST LOW 20 MIN: CPT | Performed by: FAMILY MEDICINE

## 2021-01-26 ENCOUNTER — OFFICE VISIT (OUTPATIENT)
Dept: INTERNAL MEDICINE | Facility: CLINIC | Age: 71
End: 2021-01-26

## 2021-01-26 VITALS
HEART RATE: 75 BPM | RESPIRATION RATE: 17 BRPM | BODY MASS INDEX: 29.25 KG/M2 | DIASTOLIC BLOOD PRESSURE: 74 MMHG | WEIGHT: 193 LBS | TEMPERATURE: 98.3 F | HEIGHT: 68 IN | SYSTOLIC BLOOD PRESSURE: 118 MMHG | OXYGEN SATURATION: 98 %

## 2021-01-26 DIAGNOSIS — M46.97 UNSPECIFIED INFLAMMATORY SPONDYLOPATHY, LUMBOSACRAL REGION (HCC): ICD-10-CM

## 2021-01-26 DIAGNOSIS — M47.816 FACET ARTHRITIS OF LUMBAR REGION: ICD-10-CM

## 2021-01-26 DIAGNOSIS — Z00.00 MEDICARE ANNUAL WELLNESS VISIT, SUBSEQUENT: ICD-10-CM

## 2021-01-26 DIAGNOSIS — E78.2 MIXED HYPERLIPIDEMIA: Primary | ICD-10-CM

## 2021-01-26 LAB — ERYTHROCYTE [SEDIMENTATION RATE] IN BLOOD: 1 MM/HR (ref 0–20)

## 2021-01-26 PROCEDURE — 36415 COLL VENOUS BLD VENIPUNCTURE: CPT | Performed by: FAMILY MEDICINE

## 2021-01-26 PROCEDURE — 85651 RBC SED RATE NONAUTOMATED: CPT | Performed by: FAMILY MEDICINE

## 2021-01-26 PROCEDURE — G0439 PPPS, SUBSEQ VISIT: HCPCS | Performed by: FAMILY MEDICINE

## 2021-01-26 PROCEDURE — 99214 OFFICE O/P EST MOD 30 MIN: CPT | Performed by: FAMILY MEDICINE

## 2021-01-26 NOTE — PROGRESS NOTES
"Chief Complaint  Medicare Wellness-subsequent    Subjective          Sosa Diamond presents to [facility] for   Very pleasant lady with a history of facet arthritis lumbar spine without spinal stenosis.  Previous MRI 2016 reviewed with her.  She mostly has facet arthropathy and occasional sciatica.  She does exercise bike and stretching will remain doing that as it seems to be effective.  I do not think she needs epidurals or surgical follow-up.    She has had sudden abandonment pain in the right bicep region radiating to the right shoulder about 3 weeks ago seems to be getting better without injury and came on out of the blue.  Consideration of being neuropathic pain or nerve pinching from the cervical spine.  If this becomes repetitive or progressive we will get imaging of the cervical spine.  Otherwise she had a random pain of the dorsum of the right thumb also resolved.    Labs reviewed with her fairly normal.  She had positive Cologuard a year ago and was to get colonoscopy with gastroenterology.  She will return to Dr. Greenwood for follow-up and she really needs to get the colonoscopy done.  I am told her that this is the course of action.      Objective   Vital Signs:   /74 (BP Location: Right arm, Patient Position: Sitting, Cuff Size: Adult)   Pulse 75   Temp 98.3 °F (36.8 °C)   Resp 17   Ht 172.7 cm (68\")   Wt 87.5 kg (193 lb)   SpO2 98%   BMI 29.35 kg/m²     Physical Exam  Vitals signs reviewed.   Constitutional:       Appearance: She is well-developed.   HENT:      Head: Normocephalic and atraumatic.      Right Ear: Tympanic membrane and external ear normal.      Left Ear: Tympanic membrane and external ear normal.      Nose: Nose normal.   Eyes:      Conjunctiva/sclera: Conjunctivae normal.      Pupils: Pupils are equal, round, and reactive to light.   Neck:      Musculoskeletal: Normal range of motion and neck supple.      Thyroid: No thyromegaly.      " Vascular: No JVD.   Cardiovascular:      Rate and Rhythm: Normal rate and regular rhythm.      Heart sounds: Normal heart sounds.   Pulmonary:      Effort: Pulmonary effort is normal.      Breath sounds: Normal breath sounds.   Abdominal:      General: Bowel sounds are normal.      Palpations: Abdomen is soft.   Musculoskeletal:      Lumbar back: She exhibits decreased range of motion.   Lymphadenopathy:      Cervical: No cervical adenopathy.   Skin:     General: Skin is warm and dry.      Findings: No rash.   Neurological:      Mental Status: She is alert and oriented to person, place, and time.      Cranial Nerves: No cranial nerve deficit.      Coordination: Coordination normal.   Psychiatric:         Behavior: Behavior normal.         Thought Content: Thought content normal.         Judgment: Judgment normal.        Result Review :   The following data was reviewed by: Zaheer Mccrary Jr., MD on 01/26/2021:  Common labs    Common Labsle 2/11/20 7/22/20 7/22/20 7/22/20     1141 1141 1141   Glucose   110 (A)    BUN   7 (A)    Creatinine   0.78    eGFR Non  Am   73    eGFR African Am   89    Sodium   140    Potassium   4.2    Chloride   104    Calcium   9.3    Total Protein   6.4    Albumin   4.40    Total Bilirubin   0.4    Alkaline Phosphatase   60    AST (SGOT)   27    ALT (SGPT)   30    WBC 6.66 5.80     Hemoglobin 15.0 13.4     Hematocrit 43.8 38.1     Platelets 312 292     Total Cholesterol    203 (A)   Triglycerides    180 (A)   HDL Cholesterol    48   LDL Cholesterol     119 (A)   (A) Abnormal value            Data reviewed: Radiologic studies MRI lumbar spine          Assessment and Plan    Problem List Items Addressed This Visit        Cardiac and Vasculature    Hyperlipidemia - Primary       Musculoskeletal and Injuries    Unspecified inflammatory spondylopathy, lumbosacral region (CMS/[facility])       Neuro    Facet arthritis of lumbar region      Other Visit Diagnoses     Medicare annual wellness visit,  subsequent          Plan: Continue treatment of hyperlipidemia and weight mostly with exercise and diet.  Continue exercises and stretches for low back facet arthropathy.  Recheck in a year or sooner if needed.  Finished colonoscopy with gastroenterology.    Follow Up   No follow-ups on file.  Patient was given instructions and counseling regarding her condition or for health maintenance advice. Please see specific information pulled into the AVS if appropriate.       Answers for HPI/ROS submitted by the patient on 1/24/2021   What is the primary reason for your visit?: Physical

## 2021-01-26 NOTE — PROGRESS NOTES
The ABCs of the Annual Wellness Visit  Subsequent Medicare Wellness Visit    Chief Complaint   Patient presents with   • Medicare Wellness-subsequent       Subjective   History of Present Illness:  Sosa Diamond is a 70 y.o. female who presents for a Subsequent Medicare Wellness Visit.    HEALTH RISK ASSESSMENT    Recent Hospitalizations:  No hospitalization(s) within the last year.    Current Medical Providers:  Patient Care Team:  Zaheer Mccrary Jr., MD as PCP - General (Family Medicine)    Smoking Status:  Social History     Tobacco Use   Smoking Status Never Smoker   Smokeless Tobacco Never Used       Alcohol Consumption:  Social History     Substance and Sexual Activity   Alcohol Use Yes   • Alcohol/week: 10.0 standard drinks   • Types: 10 Glasses of wine per week       Depression Screen:   PHQ-2/PHQ-9 Depression Screening 1/26/2021   Little interest or pleasure in doing things 0   Feeling down, depressed, or hopeless 0   Trouble falling or staying asleep, or sleeping too much -   Feeling tired or having little energy -   Poor appetite or overeating -   Feeling bad about yourself - or that you are a failure or have let yourself or your family down -   Trouble concentrating on things, such as reading the newspaper or watching television -   Moving or speaking so slowly that other people could have noticed. Or the opposite - being so fidgety or restless that you have been moving around a lot more than usual -   Thoughts that you would be better off dead, or of hurting yourself in some way -   Total Score 0   If you checked off any problems, how difficult have these problems made it for you to do your work, take care of things at home, or get along with other people? -       Fall Risk Screen:  STEADI Fall Risk Assessment was completed, and patient is at LOW risk for falls.Assessment completed on:1/26/2021    Health Habits and Functional and Cognitive Screening:  Functional & Cognitive Status 1/26/2021   Do you have  difficulty preparing food and eating? No   Do you have difficulty bathing yourself, getting dressed or grooming yourself? No   Do you have difficulty using the toilet? No   Do you have difficulty moving around from place to place? No   Do you have trouble with steps or getting out of a bed or a chair? No   Current Diet Well Balanced Diet   Dental Exam Up to date   Eye Exam Up to date   Exercise (times per week) 2 times per week   Current Exercise Activities Include Walking   Do you need help using the phone?  No   Are you deaf or do you have serious difficulty hearing?  No   Do you need help with transportation? No   Do you need help shopping? No   Do you need help preparing meals?  No   Do you need help with housework?  No   Do you need help with laundry? No   Do you need help taking your medications? No   Do you need help managing money? No   Do you ever drive or ride in a car without wearing a seat belt? No   Have you felt unusual stress, anger or loneliness in the last month? No   Who do you live with? Spouse   If you need help, do you have trouble finding someone available to you? No   Have you been bothered in the last four weeks by sexual problems? No   Do you have difficulty concentrating, remembering or making decisions? No         Does the patient have evidence of cognitive impairment? No    Asprin use counseling:Does not need ASA (and currently is not on it)    Age-appropriate Screening Schedule:  Refer to the list below for future screening recommendations based on patient's age, sex and/or medical conditions. Orders for these recommended tests are listed in the plan section. The patient has been provided with a written plan.    Health Maintenance   Topic Date Due   • COLONOSCOPY  01/01/2016   • PAP SMEAR  07/06/2016   • ZOSTER VACCINE (2 of 3) 01/27/2017   • LIPID PANEL  07/22/2021   • MAMMOGRAM  09/26/2021   • TDAP/TD VACCINES (2 - Td) 10/09/2025   • INFLUENZA VACCINE  Completed          The following  "portions of the patient's history were reviewed and updated as appropriate: allergies, current medications, past family history, past medical history, past social history, past surgical history and problem list.    Outpatient Medications Prior to Visit   Medication Sig Dispense Refill   • cetirizine (ZyrTEC) 10 MG tablet Take 10 mg by mouth daily     • omeprazole (priLOSEC) 40 MG capsule Take 1 capsule by mouth Daily. 90 capsule 3   • PREMPRO 0.3-1.5 MG per tablet Take 1 tablet by mouth Daily.     • ondansetron ODT (Zofran ODT) 8 MG disintegrating tablet Place 1 tablet on the tongue Every 8 (Eight) Hours As Needed for Nausea or Vomiting. 10 tablet 0     No facility-administered medications prior to visit.        Patient Active Problem List   Diagnosis   • Unspecified inflammatory spondylopathy, lumbosacral region (CMS/HCC)   • Positive colorectal cancer screening using Cologuard test   • Back pain   • Hyperlipidemia   • Facet arthritis of lumbar region       Advanced Care Planning:  ACP discussion was held with the patient during this visit. Patient has an advance directive in EMR which is still valid.     Review of Systems    Compared to one year ago, the patient feels her physical health is the same.  Compared to one year ago, the patient feels her mental health is the same.    Reviewed chart for potential of high risk medication in the elderly: not applicable  Reviewed chart for potential of harmful drug interactions in the elderly:not applicable    Objective         Vitals:    01/26/21 0904   BP: 118/74   BP Location: Right arm   Patient Position: Sitting   Cuff Size: Adult   Pulse: 75   Resp: 17   Temp: 98.3 °F (36.8 °C)   SpO2: 98%   Weight: 87.5 kg (193 lb)   Height: 172.7 cm (68\")       Body mass index is 29.35 kg/m².  Discussed the patient's BMI with her. The BMI is above average; BMI management plan is completed.    Physical Exam          Assessment/Plan   Medicare Risks and Personalized Health Plan  CMS " Preventative Services Quick Reference  Cardiovascular risk  Chronic Pain   Obesity/Overweight     The above risks/problems have been discussed with the patient.  Pertinent information has been shared with the patient in the After Visit Summary.  Follow up plans and orders are seen below in the Assessment/Plan Section.    There are no diagnoses linked to this encounter.  Follow Up:  No follow-ups on file.     An After Visit Summary and PPPS were given to the patient.

## 2021-01-27 LAB
ALBUMIN SERPL-MCNC: 4.5 G/DL (ref 3.5–5.2)
ALBUMIN/GLOB SERPL: 1.8 G/DL
ALP SERPL-CCNC: 64 U/L (ref 39–117)
ALT SERPL-CCNC: 30 U/L (ref 1–33)
APPEARANCE UR: CLEAR
AST SERPL-CCNC: 31 U/L (ref 1–32)
BASOPHILS # BLD AUTO: 0.05 10*3/MM3 (ref 0–0.2)
BASOPHILS NFR BLD AUTO: 0.8 % (ref 0–1.5)
BILIRUB SERPL-MCNC: 0.4 MG/DL (ref 0–1.2)
BILIRUB UR QL STRIP: NEGATIVE
BUN SERPL-MCNC: 13 MG/DL (ref 8–23)
BUN/CREAT SERPL: 16.5 (ref 7–25)
CALCIUM SERPL-MCNC: 9.6 MG/DL (ref 8.6–10.5)
CHLORIDE SERPL-SCNC: 100 MMOL/L (ref 98–107)
CHOLEST SERPL-MCNC: 208 MG/DL (ref 0–200)
CHOLEST/HDLC SERPL: 3.78 {RATIO}
CO2 SERPL-SCNC: 27.4 MMOL/L (ref 22–29)
COLOR UR: YELLOW
CREAT SERPL-MCNC: 0.79 MG/DL (ref 0.57–1)
CRP SERPL-MCNC: 0.44 MG/DL (ref 0–0.5)
EOSINOPHIL # BLD AUTO: 0.08 10*3/MM3 (ref 0–0.4)
EOSINOPHIL NFR BLD AUTO: 1.2 % (ref 0.3–6.2)
ERYTHROCYTE [DISTWIDTH] IN BLOOD BY AUTOMATED COUNT: 12.8 % (ref 12.3–15.4)
FOLATE SERPL-MCNC: >20 NG/ML (ref 4.78–24.2)
GLOBULIN SER CALC-MCNC: 2.5 GM/DL
GLUCOSE SERPL-MCNC: 103 MG/DL (ref 65–99)
GLUCOSE UR QL: NEGATIVE
HCT VFR BLD AUTO: 42.5 % (ref 34–46.6)
HDLC SERPL-MCNC: 55 MG/DL (ref 40–60)
HGB BLD-MCNC: 14.7 G/DL (ref 12–15.9)
HGB UR QL STRIP: NEGATIVE
IMM GRANULOCYTES # BLD AUTO: 0.03 10*3/MM3 (ref 0–0.05)
IMM GRANULOCYTES NFR BLD AUTO: 0.5 % (ref 0–0.5)
KETONES UR QL STRIP: NEGATIVE
LDLC SERPL CALC-MCNC: 123 MG/DL (ref 0–100)
LEUKOCYTE ESTERASE UR QL STRIP: NEGATIVE
LYMPHOCYTES # BLD AUTO: 1.95 10*3/MM3 (ref 0.7–3.1)
LYMPHOCYTES NFR BLD AUTO: 29.6 % (ref 19.6–45.3)
MCH RBC QN AUTO: 32.5 PG (ref 26.6–33)
MCHC RBC AUTO-ENTMCNC: 34.6 G/DL (ref 31.5–35.7)
MCV RBC AUTO: 94 FL (ref 79–97)
MONOCYTES # BLD AUTO: 0.42 10*3/MM3 (ref 0.1–0.9)
MONOCYTES NFR BLD AUTO: 6.4 % (ref 5–12)
NEUTROPHILS # BLD AUTO: 4.05 10*3/MM3 (ref 1.7–7)
NEUTROPHILS NFR BLD AUTO: 61.5 % (ref 42.7–76)
NITRITE UR QL STRIP: NEGATIVE
NRBC BLD AUTO-RTO: 0.2 /100 WBC (ref 0–0.2)
PH UR STRIP: 6 [PH] (ref 5–8)
PLATELET # BLD AUTO: 305 10*3/MM3 (ref 140–450)
POTASSIUM SERPL-SCNC: 4.6 MMOL/L (ref 3.5–5.2)
PROT SERPL-MCNC: 7 G/DL (ref 6–8.5)
PROT UR QL STRIP: NEGATIVE
RBC # BLD AUTO: 4.52 10*6/MM3 (ref 3.77–5.28)
SODIUM SERPL-SCNC: 136 MMOL/L (ref 136–145)
SP GR UR: 1.01 (ref 1–1.03)
T3FREE SERPL-MCNC: 2.9 PG/ML (ref 2–4.4)
T4 FREE SERPL-MCNC: 0.89 NG/DL (ref 0.93–1.7)
TRIGL SERPL-MCNC: 171 MG/DL (ref 0–150)
TSH SERPL DL<=0.005 MIU/L-ACNC: 2.22 UIU/ML (ref 0.27–4.2)
UROBILINOGEN UR STRIP-MCNC: NORMAL MG/DL
VIT B12 SERPL-MCNC: 496 PG/ML (ref 211–946)
VLDLC SERPL CALC-MCNC: 30 MG/DL (ref 5–40)
WBC # BLD AUTO: 6.58 10*3/MM3 (ref 3.4–10.8)

## 2021-01-28 ENCOUNTER — LAB REQUISITION (OUTPATIENT)
Dept: LAB | Facility: HOSPITAL | Age: 71
End: 2021-01-28

## 2021-01-28 DIAGNOSIS — Z00.00 ENCOUNTER FOR GENERAL ADULT MEDICAL EXAMINATION WITHOUT ABNORMAL FINDINGS: ICD-10-CM

## 2021-01-28 LAB — SARS-COV-2 ORF1AB RESP QL NAA+PROBE: NOT DETECTED

## 2021-01-28 PROCEDURE — U0004 COV-19 TEST NON-CDC HGH THRU: HCPCS | Performed by: OPHTHALMOLOGY

## 2021-02-24 ENCOUNTER — TELEPHONE (OUTPATIENT)
Dept: GASTROENTEROLOGY | Facility: CLINIC | Age: 71
End: 2021-02-24

## 2021-02-24 ENCOUNTER — OFFICE VISIT (OUTPATIENT)
Dept: GASTROENTEROLOGY | Facility: CLINIC | Age: 71
End: 2021-02-24

## 2021-02-24 VITALS
WEIGHT: 191 LBS | HEIGHT: 68 IN | SYSTOLIC BLOOD PRESSURE: 122 MMHG | BODY MASS INDEX: 28.95 KG/M2 | DIASTOLIC BLOOD PRESSURE: 75 MMHG

## 2021-02-24 DIAGNOSIS — K57.90 DIVERTICULOSIS: ICD-10-CM

## 2021-02-24 DIAGNOSIS — R19.5 POSITIVE COLORECTAL CANCER SCREENING USING COLOGUARD TEST: Primary | ICD-10-CM

## 2021-02-24 DIAGNOSIS — K76.0 FATTY LIVER: ICD-10-CM

## 2021-02-24 DIAGNOSIS — K21.9 GASTROESOPHAGEAL REFLUX DISEASE: ICD-10-CM

## 2021-02-24 PROCEDURE — 99214 OFFICE O/P EST MOD 30 MIN: CPT | Performed by: INTERNAL MEDICINE

## 2021-02-24 RX ORDER — OMEPRAZOLE 40 MG/1
40 CAPSULE, DELAYED RELEASE ORAL DAILY
Qty: 90 CAPSULE | Refills: 3 | Status: SHIPPED | OUTPATIENT
Start: 2021-02-24 | End: 2022-03-09

## 2021-02-24 NOTE — PROGRESS NOTES
Chief Complaint   Patient presents with   • Heartburn   • Diverticulitis       History of Present Illness:   70 y.o. female who has a history of gastroesophageal reflux disease, history of diverticulitis and underwent a partial colectomy, and has fatty liver. She had partial colectomy in 2010 for diverticular disease.  She had a barium enema in 2 of 2020 that showed:  CONCLUSION: Diverticulosis. Some retained stool. No definite polyp or  other mucosal mass or encircling lesion was seen. The distal colonic  anastomosis appears to have luminal diameter of 2 cm or greater.  She had a positive Cologuard in 1 of 2020.  The above barium enema was done because of that.       I last saw her in 1 of 2020.  My assessment and plan was as follows:  Assessment:  1.  Gastroesophageal reflux disease.  2.  History of diverticulitis and had a partial colectomy in the past.  3.  History of fatty liver.  4. 7/13 CT abd/pelvis which showed an internal hernia of the sigmoid sigmoid mesocolon     Recommendations:  1. She doesn't want a BE or colonoscopy now. We will do a ColoGuard instead.   2. Continue Omeprazole 40 mg/day.   3. F/u one year       Her last c/s was in 2006. No rectal bleeding or melena. No abdominal or chest pain. No nausea or vovmting. No heartburn. Drinks 1-2 glasses of wine/night. Nonsmoker. 2 cups of coffee/day. No dysphagia.    Past Medical History:   Diagnosis Date   • Allergic    • Back pain     left when sleep on stomach  chronic   • Chicken pox     childhood   • Condition not found     VASOMOTOR EPISODES   • Constipation     chronic   • Decreased visual acuity    • Diverticulitis    • Diverticulosis    • Eye pain     have been having probelsm with left eye   • Fatigue     Fatigue / loss of energy   • Flushing    • GERD (gastroesophageal reflux disease)     resolved with omeprazole   • Hayfever    • Heartburn    • Hemorrhoids     25 years ago   • Hernia 1999   • Herpes    • History of CT scan of abdomen  07/30/2013   • History of EKG     10/26/2015 NORMAL / NO CHANGE  10/09/14   • History of mammogram     Date of last mammogram 09/2015. Mammogram Normal.  05/01/2014 DR MANZANARES   • History of Papanicolaou smear of cervix     Date of last PAP test: 09/2015. Pap Normal.   • History of TB skin testing 10/09/2014    TB Skin Test   • Hyperlipidemia    • Measles     childhood   • Melanoma (CMS/HCC)     right leg   • Mumps     childhood   • Night sweats    • Pregnancy     Number of Pregnancies: 3.  Number of Miscarriages: 1.  Number of Live Births: 2.   • Sinus trouble    • Skin melanoma (CMS/HCC) 03/2014    Skin cancer - melanoma - 3/2014   • Sleep trouble     Sleep problems has hot flashes, night sweats   • Visual impairment    • Weight gain        Past Surgical History:   Procedure Laterality Date   • CATARACT EXTRACTION, BILATERAL Bilateral 02/2021   • COLON RESECTION  12/15/2010    Event: laparoscopic sigmoid colectomy  Provider: Elizabeth Rudd MD   • COLONOSCOPY  08/19/2006    multiple sigm tics, random path neg   • COLONOSCOPY  2006   • ENDOSCOPY  08/19/2006    EGD-erythamatous gastropathy, neg celiac, neg h pylori   • FEMORAL HERNIA REPAIR  2000   • HEMORRHOIDECTOMY     • HERNIA REPAIR Right 1999    hernia in R thigh   • OTHER SURGICAL HISTORY Right     MELANOMA RIGHT LEG   • PAP SMEAR  05/01/2014    DR MANZANARES         Current Outpatient Medications:   •  cetirizine (ZyrTEC) 10 MG tablet, Take 10 mg by mouth daily, Disp: , Rfl:   •  omeprazole (priLOSEC) 40 MG capsule, Take 1 capsule by mouth Daily., Disp: 90 capsule, Rfl: 3  •  PREMPRO 0.3-1.5 MG per tablet, Take 1 tablet by mouth Daily., Disp: , Rfl:     Allergies   Allergen Reactions   • Doxycycline Itching       Family History   Problem Relation Age of Onset   • Arthritis Father         osteoarthritis   • Cancer Father         esophageal   • Hypertension Father    • Ulcers Father         gastric   • Glaucoma Father         also macular degeneration   • Alcohol  abuse Father    • Colon polyps Father         colon polyps but no cancer   • Arthritis Paternal Grandmother         rheumatoid   • Alcohol abuse Paternal Uncle    • Breast cancer Paternal Aunt    • Arthritis Maternal Grandmother        Social History     Socioeconomic History   • Marital status:      Spouse name: Not on file   • Number of children: Not on file   • Years of education: Not on file   • Highest education level: Not on file   Tobacco Use   • Smoking status: Never Smoker   • Smokeless tobacco: Never Used   Substance and Sexual Activity   • Alcohol use: Yes     Alcohol/week: 10.0 standard drinks     Types: 10 Glasses of wine per week   • Drug use: No   • Sexual activity: Defer       Review of Systems   Gastrointestinal: Negative for abdominal pain.     Pertinent positives and negatives documented in the HPI and all other systems reviewed and were found to be negative.  Vitals:    02/24/21 1115   BP: 122/75       Physical Exam  Vitals signs reviewed.   Constitutional:       General: She is not in acute distress.     Appearance: Normal appearance. She is well-developed. She is not diaphoretic.   HENT:      Head: Normocephalic and atraumatic. Hair is normal.      Right Ear: Hearing, tympanic membrane, ear canal and external ear normal. No decreased hearing noted. No drainage.      Left Ear: Hearing, tympanic membrane, ear canal and external ear normal. No decreased hearing noted.      Nose: Nose normal. No nasal deformity.      Mouth/Throat:      Mouth: Mucous membranes are moist.   Eyes:      General: Lids are normal.         Right eye: No discharge.         Left eye: No discharge.      Extraocular Movements: Extraocular movements intact.      Conjunctiva/sclera: Conjunctivae normal.      Pupils: Pupils are equal, round, and reactive to light.   Neck:      Musculoskeletal: Normal range of motion and neck supple.      Thyroid: No thyromegaly.      Vascular: No JVD.      Trachea: No tracheal deviation.    Cardiovascular:      Rate and Rhythm: Normal rate and regular rhythm.      Pulses: Normal pulses.      Heart sounds: Normal heart sounds. No murmur. No friction rub. No gallop.    Pulmonary:      Effort: Pulmonary effort is normal. No respiratory distress.      Breath sounds: Normal breath sounds. No wheezing or rales.   Chest:      Chest wall: No tenderness.   Abdominal:      General: Bowel sounds are normal. There is no distension.      Palpations: Abdomen is soft. There is no mass.      Tenderness: There is no abdominal tenderness. There is no guarding or rebound.      Hernia: No hernia is present.   Musculoskeletal: Normal range of motion.         General: No tenderness or deformity.   Lymphadenopathy:      Cervical: No cervical adenopathy.   Skin:     General: Skin is warm and dry.      Findings: No erythema or rash.   Neurological:      Mental Status: She is alert and oriented to person, place, and time.      Cranial Nerves: No cranial nerve deficit.      Motor: No abnormal muscle tone.      Coordination: Coordination normal.      Deep Tendon Reflexes: Reflexes are normal and symmetric. Reflexes normal.   Psychiatric:         Mood and Affect: Mood normal.         Behavior: Behavior normal.         Thought Content: Thought content normal.         Judgment: Judgment normal.         Diagnoses and all orders for this visit:    1. Positive colorectal cancer screening using Cologuard test (Primary)  -     Case Request; Standing  -     Case Request    2. Gastroesophageal reflux disease  -     omeprazole (priLOSEC) 40 MG capsule; Take 1 capsule by mouth Daily.  Dispense: 90 capsule; Refill: 3  -     Case Request; Standing  -     Case Request    3. Fatty liver  -     Case Request; Standing  -     Case Request    4. Diverticulosis  -     Case Request; Standing  -     Case Request    Other orders  -     Follow Anesthesia Guidelines / Standing Orders; Future  -     Obtain Informed Consent; Future  -     Implement  Anesthesia orders day of procedure.; Standing  -     Obtain informed consent; Standing  -     Verify bowel prep was successful; Standing  -     Give tap water enema if bowel prep was insufficient; Standing      Assessment:  1. Gastroesophageal reflux disease.  2.  History of diverticulitis and had a partial colectomy in the past.  3.  History of fatty liver.  4.    Recommendations:  1. Colonoscopy + EGD  2. Try taking Omeprazole every other day.   3. Lose weight.    No follow-ups on file.    Vincent Greenwood MD  2/24/2021

## 2021-03-19 ENCOUNTER — BULK ORDERING (OUTPATIENT)
Dept: CASE MANAGEMENT | Facility: OTHER | Age: 71
End: 2021-03-19

## 2021-03-19 DIAGNOSIS — Z23 IMMUNIZATION DUE: ICD-10-CM

## 2021-04-01 ENCOUNTER — TRANSCRIBE ORDERS (OUTPATIENT)
Dept: SLEEP MEDICINE | Facility: HOSPITAL | Age: 71
End: 2021-04-01

## 2021-04-01 DIAGNOSIS — Z01.818 OTHER SPECIFIED PRE-OPERATIVE EXAMINATION: Primary | ICD-10-CM

## 2021-04-14 ENCOUNTER — LAB (OUTPATIENT)
Dept: LAB | Facility: HOSPITAL | Age: 71
End: 2021-04-14

## 2021-04-14 DIAGNOSIS — Z01.818 OTHER SPECIFIED PRE-OPERATIVE EXAMINATION: ICD-10-CM

## 2021-04-14 PROCEDURE — U0004 COV-19 TEST NON-CDC HGH THRU: HCPCS

## 2021-04-14 PROCEDURE — C9803 HOPD COVID-19 SPEC COLLECT: HCPCS

## 2021-04-14 PROCEDURE — U0005 INFEC AGEN DETEC AMPLI PROBE: HCPCS

## 2021-04-15 LAB — SARS-COV-2 RNA RESP QL NAA+PROBE: NOT DETECTED

## 2021-04-16 ENCOUNTER — ANESTHESIA EVENT (OUTPATIENT)
Dept: GASTROENTEROLOGY | Facility: HOSPITAL | Age: 71
End: 2021-04-16

## 2021-04-16 ENCOUNTER — HOSPITAL ENCOUNTER (OUTPATIENT)
Facility: HOSPITAL | Age: 71
Setting detail: HOSPITAL OUTPATIENT SURGERY
Discharge: HOME | End: 2021-04-16
Attending: INTERNAL MEDICINE | Admitting: INTERNAL MEDICINE

## 2021-04-16 ENCOUNTER — ANESTHESIA (OUTPATIENT)
Dept: GASTROENTEROLOGY | Facility: HOSPITAL | Age: 71
End: 2021-04-16

## 2021-04-16 VITALS
HEART RATE: 73 BPM | WEIGHT: 192.1 LBS | HEIGHT: 68 IN | OXYGEN SATURATION: 99 % | DIASTOLIC BLOOD PRESSURE: 84 MMHG | BODY MASS INDEX: 29.12 KG/M2 | RESPIRATION RATE: 14 BRPM | SYSTOLIC BLOOD PRESSURE: 105 MMHG

## 2021-04-16 DIAGNOSIS — R19.5 POSITIVE COLORECTAL CANCER SCREENING USING COLOGUARD TEST: ICD-10-CM

## 2021-04-16 DIAGNOSIS — K21.9 GASTROESOPHAGEAL REFLUX DISEASE: ICD-10-CM

## 2021-04-16 DIAGNOSIS — K76.0 FATTY LIVER: ICD-10-CM

## 2021-04-16 DIAGNOSIS — K57.90 DIVERTICULOSIS: ICD-10-CM

## 2021-04-16 PROCEDURE — 45381 COLONOSCOPY SUBMUCOUS NJX: CPT | Performed by: INTERNAL MEDICINE

## 2021-04-16 PROCEDURE — 25010000002 PROPOFOL 10 MG/ML EMULSION: Performed by: ANESTHESIOLOGY

## 2021-04-16 PROCEDURE — 88305 TISSUE EXAM BY PATHOLOGIST: CPT | Performed by: INTERNAL MEDICINE

## 2021-04-16 PROCEDURE — 45385 COLONOSCOPY W/LESION REMOVAL: CPT | Performed by: INTERNAL MEDICINE

## 2021-04-16 PROCEDURE — 43239 EGD BIOPSY SINGLE/MULTIPLE: CPT | Performed by: INTERNAL MEDICINE

## 2021-04-16 PROCEDURE — 45380 COLONOSCOPY AND BIOPSY: CPT | Performed by: INTERNAL MEDICINE

## 2021-04-16 RX ORDER — SODIUM CHLORIDE, SODIUM LACTATE, POTASSIUM CHLORIDE, CALCIUM CHLORIDE 600; 310; 30; 20 MG/100ML; MG/100ML; MG/100ML; MG/100ML
30 INJECTION, SOLUTION INTRAVENOUS CONTINUOUS PRN
Status: DISCONTINUED | OUTPATIENT
Start: 2021-04-16 | End: 2021-04-16 | Stop reason: HOSPADM

## 2021-04-16 RX ORDER — LIDOCAINE HYDROCHLORIDE 20 MG/ML
INJECTION, SOLUTION INFILTRATION; PERINEURAL AS NEEDED
Status: DISCONTINUED | OUTPATIENT
Start: 2021-04-16 | End: 2021-04-16 | Stop reason: SURG

## 2021-04-16 RX ORDER — PROPOFOL 10 MG/ML
VIAL (ML) INTRAVENOUS AS NEEDED
Status: DISCONTINUED | OUTPATIENT
Start: 2021-04-16 | End: 2021-04-16 | Stop reason: SURG

## 2021-04-16 RX ADMIN — PROPOFOL 50 MG: 10 INJECTION, EMULSION INTRAVENOUS at 11:21

## 2021-04-16 RX ADMIN — PROPOFOL 140 MCG/KG/MIN: 10 INJECTION, EMULSION INTRAVENOUS at 11:22

## 2021-04-16 RX ADMIN — LIDOCAINE HYDROCHLORIDE 60 MG: 20 INJECTION, SOLUTION INFILTRATION; PERINEURAL at 11:21

## 2021-04-16 RX ADMIN — PROPOFOL 50 MG: 10 INJECTION, EMULSION INTRAVENOUS at 11:19

## 2021-04-16 RX ADMIN — SODIUM CHLORIDE, POTASSIUM CHLORIDE, SODIUM LACTATE AND CALCIUM CHLORIDE 30 ML/HR: 600; 310; 30; 20 INJECTION, SOLUTION INTRAVENOUS at 10:51

## 2021-04-16 NOTE — ANESTHESIA POSTPROCEDURE EVALUATION
"Patient: Sosa Diamond    Procedure Summary     Date: 04/16/21 Room / [facility]    Anesthesia Start: 1113 Anesthesia Stop: 1150    Procedures:       ESOPHAGOGASTRODUODENOSCOPY WITH BX (N/A Esophagus)      COLONOSCOPY TO CECUM WITH POLYPECTOMY (COLD) and NS INJECTION (1CC) (N/A ) Diagnosis:       Gastroesophageal reflux disease      Positive colorectal cancer screening using Cologuard test      Fatty liver      Diverticulosis      (Gastroesophageal reflux disease [K21.9])      (Positive colorectal cancer screening using Cologuard test [R19.5])      (Fatty liver [K76.0])      (Diverticulosis [K57.90])    Surgeons: Vincent Greenwood MD Provider: Mitesh King MD    Anesthesia Type: MAC ASA Status: 2          Anesthesia Type: MAC    Vitals  No vitals data found for the desired time range.          Post Anesthesia Care and Evaluation    Patient location during evaluation: bedside  Patient participation: complete - patient participated  Level of consciousness: awake  Pain score: 2  Pain management: adequate  Airway patency: patent  Anesthetic complications: No anesthetic complications  PONV Status: none  Cardiovascular status: acceptable  Respiratory status: acceptable  Hydration status: acceptable    Comments: /82 (BP Location: Left arm, Patient Position: Lying)   Pulse 79   Resp 15   Ht 172.7 cm (68\")   Wt 87.1 kg (192 lb 1.6 oz)   LMP  (LMP Unknown)   SpO2 98%   BMI 29.21 kg/m²         "

## 2021-04-16 NOTE — ANESTHESIA PREPROCEDURE EVALUATION
Anesthesia Evaluation     Patient summary reviewed and Nursing notes reviewed   NPO Solid Status: > 8 hours  NPO Liquid Status: > 2 hours           Airway   Mallampati: II  TM distance: >3 FB  Neck ROM: full  Dental - normal exam     Pulmonary - negative pulmonary ROS and normal exam   Cardiovascular - normal exam    (+) hyperlipidemia,       Neuro/Psych- negative ROS  GI/Hepatic/Renal/Endo    (+)  GERD,  liver disease fatty liver disease,     Musculoskeletal     (+) back pain,   Abdominal    Substance History - negative use     OB/GYN negative ob/gyn ROS         Other   arthritis,    history of cancer                    Anesthesia Plan    ASA 2     MAC       Anesthetic plan, all risks, benefits, and alternatives have been provided, discussed and informed consent has been obtained with: patient.

## 2021-04-16 NOTE — H&P
Chief Complaint   Patient presents with   • Heartburn   • Diverticulitis         History of Present Illness:   70 y.o. female who has a history of gastroesophageal reflux disease, history of diverticulitis and underwent a partial colectomy, and has fatty liver. She had partial colectomy in 2010 for diverticular disease.  She had a barium enema in 2 of 2020 that showed:  CONCLUSION: Diverticulosis. Some retained stool. No definite polyp or  other mucosal mass or encircling lesion was seen. The distal colonic  anastomosis appears to have luminal diameter of 2 cm or greater.  She had a positive Cologuard in 1 of 2020.  The above barium enema was done because of that.       I last saw her in 1 of 2020.  My assessment and plan was as follows:  Assessment:  1.  Gastroesophageal reflux disease.  2.  History of diverticulitis and had a partial colectomy in the past.  3.  History of fatty liver.  4. 7/13 CT abd/pelvis which showed an internal hernia of the sigmoid sigmoid mesocolon     Recommendations:  1. She doesn't want a BE or colonoscopy now. We will do a ColoGuard instead.   2. Continue Omeprazole 40 mg/day.   3. F/u one year       Her last c/s was in 2006. No rectal bleeding or melena. No abdominal or chest pain. No nausea or vovmting. No heartburn. Drinks 1-2 glasses of wine/night. Nonsmoker. 2 cups of coffee/day. No dysphagia.     Medical History        Past Medical History:   Diagnosis Date   • Allergic     • Back pain       left when sleep on stomach  chronic   • Chicken pox       childhood   • Condition not found       VASOMOTOR EPISODES   • Constipation       chronic   • Decreased visual acuity     • Diverticulitis     • Diverticulosis     • Eye pain       have been having probelsm with left eye   • Fatigue       Fatigue / loss of energy   • Flushing     • GERD (gastroesophageal reflux disease)       resolved with omeprazole   • Hayfever     • Heartburn     • Hemorrhoids       25 years ago   • Hernia 1999   •  Herpes     • History of CT scan of abdomen 07/30/2013   • History of EKG       10/26/2015 NORMAL / NO CHANGE  10/09/14   • History of mammogram       Date of last mammogram 09/2015. Mammogram Normal.  05/01/2014 DR MANZANARES   • History of Papanicolaou smear of cervix       Date of last PAP test: 09/2015. Pap Normal.   • History of TB skin testing 10/09/2014     TB Skin Test   • Hyperlipidemia     • Measles       childhood   • Melanoma (CMS/HCC)       right leg   • Mumps       childhood   • Night sweats     • Pregnancy       Number of Pregnancies: 3.  Number of Miscarriages: 1.  Number of Live Births: 2.   • Sinus trouble     • Skin melanoma (CMS/HCC) 03/2014     Skin cancer - melanoma - 3/2014   • Sleep trouble       Sleep problems has hot flashes, night sweats   • Visual impairment     • Weight gain              Surgical History         Past Surgical History:   Procedure Laterality Date   • CATARACT EXTRACTION, BILATERAL Bilateral 02/2021   • COLON RESECTION   12/15/2010     Event: laparoscopic sigmoid colectomy  Provider: Elizabeth Rudd MD   • COLONOSCOPY   08/19/2006     multiple sigm tics, random path neg   • COLONOSCOPY   2006   • ENDOSCOPY   08/19/2006     EGD-erythamatous gastropathy, neg celiac, neg h pylori   • FEMORAL HERNIA REPAIR   2000   • HEMORRHOIDECTOMY       • HERNIA REPAIR Right 1999     hernia in R thigh   • OTHER SURGICAL HISTORY Right       MELANOMA RIGHT LEG   • PAP SMEAR   05/01/2014     DR MANZANARES               Current Outpatient Medications:   •  cetirizine (ZyrTEC) 10 MG tablet, Take 10 mg by mouth daily, Disp: , Rfl:   •  omeprazole (priLOSEC) 40 MG capsule, Take 1 capsule by mouth Daily., Disp: 90 capsule, Rfl: 3  •  PREMPRO 0.3-1.5 MG per tablet, Take 1 tablet by mouth Daily., Disp: , Rfl:           Allergies   Allergen Reactions   • Doxycycline Itching         Family History   Problem Relation Age of Onset   • Arthritis Father           osteoarthritis   • Cancer Father            esophageal   • Hypertension Father     • Ulcers Father           gastric   • Glaucoma Father           also macular degeneration   • Alcohol abuse Father     • Colon polyps Father           colon polyps but no cancer   • Arthritis Paternal Grandmother           rheumatoid   • Alcohol abuse Paternal Uncle     • Breast cancer Paternal Aunt     • Arthritis Maternal Grandmother           Social History   Social History            Socioeconomic History   • Marital status:        Spouse name: Not on file   • Number of children: Not on file   • Years of education: Not on file   • Highest education level: Not on file   Tobacco Use   • Smoking status: Never Smoker   • Smokeless tobacco: Never Used   Substance and Sexual Activity   • Alcohol use: Yes       Alcohol/week: 10.0 standard drinks       Types: 10 Glasses of wine per week   • Drug use: No   • Sexual activity: Defer            Review of Systems   Gastrointestinal: Negative for abdominal pain.      Pertinent positives and negatives documented in the HPI and all other systems reviewed and were found to be negative.      Vitals:     02/24/21 1115   BP: 122/75         Physical Exam  Vitals signs reviewed.   Constitutional:       General: She is not in acute distress.     Appearance: Normal appearance. She is well-developed. She is not diaphoretic.   HENT:      Head: Normocephalic and atraumatic. Hair is normal.      Right Ear: Hearing, tympanic membrane, ear canal and external ear normal. No decreased hearing noted. No drainage.      Left Ear: Hearing, tympanic membrane, ear canal and external ear normal. No decreased hearing noted.      Nose: Nose normal. No nasal deformity.      Mouth/Throat:      Mouth: Mucous membranes are moist.   Eyes:      General: Lids are normal.         Right eye: No discharge.         Left eye: No discharge.      Extraocular Movements: Extraocular movements intact.      Conjunctiva/sclera: Conjunctivae normal.      Pupils: Pupils are  equal, round, and reactive to light.   Neck:      Musculoskeletal: Normal range of motion and neck supple.      Thyroid: No thyromegaly.      Vascular: No JVD.      Trachea: No tracheal deviation.   Cardiovascular:      Rate and Rhythm: Normal rate and regular rhythm.      Pulses: Normal pulses.      Heart sounds: Normal heart sounds. No murmur. No friction rub. No gallop.    Pulmonary:      Effort: Pulmonary effort is normal. No respiratory distress.      Breath sounds: Normal breath sounds. No wheezing or rales.   Chest:      Chest wall: No tenderness.   Abdominal:      General: Bowel sounds are normal. There is no distension.      Palpations: Abdomen is soft. There is no mass.      Tenderness: There is no abdominal tenderness. There is no guarding or rebound.      Hernia: No hernia is present.   Musculoskeletal: Normal range of motion.         General: No tenderness or deformity.   Lymphadenopathy:      Cervical: No cervical adenopathy.   Skin:     General: Skin is warm and dry.      Findings: No erythema or rash.   Neurological:      Mental Status: She is alert and oriented to person, place, and time.      Cranial Nerves: No cranial nerve deficit.      Motor: No abnormal muscle tone.      Coordination: Coordination normal.      Deep Tendon Reflexes: Reflexes are normal and symmetric. Reflexes normal.   Psychiatric:         Mood and Affect: Mood normal.         Behavior: Behavior normal.         Thought Content: Thought content normal.         Judgment: Judgment normal.            Diagnoses and all orders for this visit:     1. Positive colorectal cancer screening using Cologuard test (Primary)  -     Case Request; Standing  -     Case Request     2. Gastroesophageal reflux disease  -     omeprazole (priLOSEC) 40 MG capsule; Take 1 capsule by mouth Daily.  Dispense: 90 capsule; Refill: 3  -     Case Request; Standing  -     Case Request     3. Fatty liver  -     Case Request; Standing  -     Case Request     4.  Diverticulosis  -     Case Request; Standing  -     Case Request     Other orders  -     Follow Anesthesia Guidelines / Standing Orders; Future  -     Obtain Informed Consent; Future  -     Implement Anesthesia orders day of procedure.; Standing  -     Obtain informed consent; Standing  -     Verify bowel prep was successful; Standing  -     Give tap water enema if bowel prep was insufficient; Standing        Assessment:  1. Gastroesophageal reflux disease.  2.  History of diverticulitis and had a partial colectomy in the past.  3.  History of fatty liver.  4.     Recommendations:  1. Colonoscopy + EGD  2. Try taking Omeprazole every other day.   3. Lose weight.     No follow-ups on file.     4/16/21 - No change from the above H and P.   Vincent Greenwood MD

## 2021-04-19 LAB
CYTO UR: NORMAL
LAB AP CASE REPORT: NORMAL
PATH REPORT.FINAL DX SPEC: NORMAL
PATH REPORT.GROSS SPEC: NORMAL

## 2021-04-30 NOTE — PROGRESS NOTES
04/30/21  Tell her that pathology from her recent EGD looked good.  The colon polyps that were removed were not cancerous but one was precancerous.  I recommend that she have a repeat colonoscopy in 5 years.  Please fax a copy of this report to her PCP.  Marge prieto

## 2021-05-07 ENCOUNTER — TELEPHONE (OUTPATIENT)
Dept: GASTROENTEROLOGY | Facility: CLINIC | Age: 71
End: 2021-05-07

## 2021-05-07 NOTE — TELEPHONE ENCOUNTER
Call to pt.  Advise per Dr Greenwood note.  Verb understanding.     C/s for 4/16/26 placed in recall and HM.    Update to Zaheer Mccrary.

## 2021-05-07 NOTE — TELEPHONE ENCOUNTER
----- Message from Vincent Greenwood MD sent at 4/30/2021  3:45 PM EDT -----  04/30/21  Tell her that pathology from her recent EGD looked good.  The colon polyps that were removed were not cancerous but one was precancerous.  I recommend that she have a repeat colonoscopy in 5 years.  Please fax a copy of this report to her PCP.  Thx. kjh

## 2021-05-21 ENCOUNTER — TELEPHONE (OUTPATIENT)
Dept: GASTROENTEROLOGY | Facility: CLINIC | Age: 71
End: 2021-05-21

## 2022-01-28 ENCOUNTER — OFFICE VISIT (OUTPATIENT)
Dept: INTERNAL MEDICINE | Facility: CLINIC | Age: 72
End: 2022-01-28

## 2022-01-28 VITALS
WEIGHT: 196 LBS | SYSTOLIC BLOOD PRESSURE: 130 MMHG | OXYGEN SATURATION: 99 % | HEIGHT: 68 IN | BODY MASS INDEX: 29.7 KG/M2 | TEMPERATURE: 98 F | HEART RATE: 92 BPM | DIASTOLIC BLOOD PRESSURE: 80 MMHG

## 2022-01-28 DIAGNOSIS — M47.816 FACET ARTHRITIS OF LUMBAR REGION: Primary | ICD-10-CM

## 2022-01-28 DIAGNOSIS — M54.41 CHRONIC RIGHT-SIDED LOW BACK PAIN WITH RIGHT-SIDED SCIATICA: ICD-10-CM

## 2022-01-28 DIAGNOSIS — R09.89 LABILE BLOOD PRESSURE: ICD-10-CM

## 2022-01-28 DIAGNOSIS — K76.0 FATTY LIVER: ICD-10-CM

## 2022-01-28 DIAGNOSIS — L60.0 INGROWN TOENAIL OF RIGHT FOOT: ICD-10-CM

## 2022-01-28 DIAGNOSIS — H65.21 RIGHT CHRONIC SEROUS OTITIS MEDIA: ICD-10-CM

## 2022-01-28 DIAGNOSIS — G89.29 CHRONIC RIGHT-SIDED LOW BACK PAIN WITH RIGHT-SIDED SCIATICA: ICD-10-CM

## 2022-01-28 DIAGNOSIS — F41.9 ANXIETY: ICD-10-CM

## 2022-01-28 DIAGNOSIS — Z00.00 MEDICARE ANNUAL WELLNESS VISIT, SUBSEQUENT: ICD-10-CM

## 2022-01-28 DIAGNOSIS — E78.2 MIXED HYPERLIPIDEMIA: ICD-10-CM

## 2022-01-28 DIAGNOSIS — Z20.822 CLOSE EXPOSURE TO COVID-19 VIRUS: ICD-10-CM

## 2022-01-28 DIAGNOSIS — R73.09 ELEVATED GLUCOSE: ICD-10-CM

## 2022-01-28 DIAGNOSIS — E55.9 HYPOVITAMINOSIS D: ICD-10-CM

## 2022-01-28 PROCEDURE — 1159F MED LIST DOCD IN RCRD: CPT | Performed by: FAMILY MEDICINE

## 2022-01-28 PROCEDURE — 99214 OFFICE O/P EST MOD 30 MIN: CPT | Performed by: FAMILY MEDICINE

## 2022-01-28 PROCEDURE — G0439 PPPS, SUBSEQ VISIT: HCPCS | Performed by: FAMILY MEDICINE

## 2022-01-28 PROCEDURE — 1170F FXNL STATUS ASSESSED: CPT | Performed by: FAMILY MEDICINE

## 2022-01-28 RX ORDER — ALPRAZOLAM 0.5 MG/1
0.5 TABLET ORAL 2 TIMES DAILY PRN
Qty: 30 TABLET | Refills: 1 | Status: SHIPPED | OUTPATIENT
Start: 2022-01-28 | End: 2022-07-29 | Stop reason: SDUPTHER

## 2022-01-28 RX ORDER — AZITHROMYCIN 250 MG/1
TABLET, FILM COATED ORAL
Qty: 6 TABLET | Refills: 0 | Status: SHIPPED | OUTPATIENT
Start: 2022-01-28 | End: 2022-07-29

## 2022-01-28 NOTE — PROGRESS NOTES
The ABCs of the Annual Wellness Visit  Subsequent Medicare Wellness Visit    Chief Complaint   Patient presents with   • Medicare Wellness-subsequent   • Hypertension      Subjective    History of Present Illness:  Sosa Diamond is a 71 y.o. female who presents for a Subsequent Medicare Wellness Visit.    The following portions of the patient's history were reviewed and   updated as appropriate: allergies, current medications, past family history, past medical history, past social history, past surgical history and problem list.    Compared to one year ago, the patient feels her physical   health is the same.    Compared to one year ago, the patient feels her mental   health is the same.    Recent Hospitalizations:  She was not admitted to the hospital during the last year.       Current Medical Providers:  Patient Care Team:  Zaheer Mccrary Jr., MD as PCP - General (Family Medicine)    Outpatient Medications Prior to Visit   Medication Sig Dispense Refill   • cetirizine (ZyrTEC) 10 MG tablet Take 10 mg by mouth daily     • omeprazole (priLOSEC) 40 MG capsule Take 1 capsule by mouth Daily. 90 capsule 3   • PREMPRO 0.3-1.5 MG per tablet Take 1 tablet by mouth Daily.     • amoxicillin-clavulanate (AUGMENTIN) 875-125 MG per tablet 1 tablet PO q12 hours 20 tablet 0     No facility-administered medications prior to visit.       No opioid medication identified on active medication list. I have reviewed chart for other potential  high risk medication/s and harmful drug interactions in the elderly.          Aspirin is not on active medication list.  Aspirin use is not indicated based on review of current medical condition/s. Risk of harm outweighs potential benefits.  .    Patient Active Problem List   Diagnosis   • Unspecified inflammatory spondylopathy, lumbosacral region (HCC)   • Positive colorectal cancer screening using Cologuard test   • Back pain   • Hyperlipidemia   • Facet arthritis of lumbar region   • GERD  "(gastroesophageal reflux disease)   • Gastroesophageal reflux disease   • Fatty liver   • Diverticulosis     Advance Care Planning  Advance Directive is not on file.  ACP discussion was held with the patient during this visit. Patient has an advance directive (not in EMR), copy requested.          Objective    Vitals:    01/28/22 0939   BP: 130/80   Pulse: 92   Temp: 98 °F (36.7 °C)   TempSrc: Temporal   SpO2: 99%   Weight: 88.9 kg (196 lb)   Height: 172.7 cm (67.99\")     BMI Readings from Last 1 Encounters:   01/28/22 29.81 kg/m²   BMI is above normal parameters. Recommendations include: nutrition counseling    Does the patient have evidence of cognitive impairment? No    Physical Exam            HEALTH RISK ASSESSMENT    Smoking Status:  Social History     Tobacco Use   Smoking Status Never Smoker   Smokeless Tobacco Never Used     Alcohol Consumption:  Social History     Substance and Sexual Activity   Alcohol Use Yes   • Alcohol/week: 10.0 standard drinks   • Types: 10 Glasses of wine per week    Comment: daily     Fall Risk Screen:    MORIAHADI Fall Risk Assessment was completed, and patient is at LOW risk for falls.Assessment completed on:1/28/2022    Depression Screening:  PHQ-2/PHQ-9 Depression Screening 1/28/2022   Little interest or pleasure in doing things 0   Feeling down, depressed, or hopeless 0   Trouble falling or staying asleep, or sleeping too much -   Feeling tired or having little energy -   Poor appetite or overeating -   Feeling bad about yourself - or that you are a failure or have let yourself or your family down -   Trouble concentrating on things, such as reading the newspaper or watching television -   Moving or speaking so slowly that other people could have noticed. Or the opposite - being so fidgety or restless that you have been moving around a lot more than usual -   Thoughts that you would be better off dead, or of hurting yourself in some way -   Total Score 0   If you checked off any " problems, how difficult have these problems made it for you to do your work, take care of things at home, or get along with other people? -       Health Habits and Functional and Cognitive Screening:  Functional & Cognitive Status 1/28/2022   Do you have difficulty preparing food and eating? No   Do you have difficulty bathing yourself, getting dressed or grooming yourself? No   Do you have difficulty using the toilet? No   Do you have difficulty moving around from place to place? No   Do you have trouble with steps or getting out of a bed or a chair? No   Current Diet Well Balanced Diet   Dental Exam Up to date   Eye Exam Up to date   Exercise (times per week) 3 times per week   Current Exercises Include Walking;Home Fitness Gym   Current Exercise Activities Include -   Do you need help using the phone?  No   Are you deaf or do you have serious difficulty hearing?  No   Do you need help with transportation? No   Do you need help shopping? No   Do you need help preparing meals?  No   Do you need help with housework?  No   Do you need help with laundry? No   Do you need help taking your medications? No   Do you need help managing money? No   Do you ever drive or ride in a car without wearing a seat belt? No   Have you felt unusual stress, anger or loneliness in the last month? No   Who do you live with? Spouse   If you need help, do you have trouble finding someone available to you? No   Have you been bothered in the last four weeks by sexual problems? No   Do you have difficulty concentrating, remembering or making decisions? No       Age-appropriate Screening Schedule:  Refer to the list below for future screening recommendations based on patient's age, sex and/or medical conditions. Orders for these recommended tests are listed in the plan section. The patient has been provided with a written plan.    Health Maintenance   Topic Date Due   • PAP SMEAR  Never done   • ZOSTER VACCINE (2 of 3) 01/27/2017   • DXA SCAN   08/03/2020   • LIPID PANEL  01/26/2022   • MAMMOGRAM  01/12/2023   • TDAP/TD VACCINES (2 - Td or Tdap) 10/09/2025   • INFLUENZA VACCINE  Completed              Assessment/Plan   CMS Preventative Services Quick Reference  Risk Factors Identified During Encounter  Cardiovascular Disease  Chronic Pain   Fall Risk-High or Moderate  The above risks/problems have been discussed with the patient.  Follow up actions/plans if indicated are seen below in the Assessment/Plan Section.  Pertinent information has been shared with the patient in the After Visit Summary.    There are no diagnoses linked to this encounter.    Follow Up:   No follow-ups on file.     An After Visit Summary and PPPS were made available to the patient.

## 2022-01-28 NOTE — PROGRESS NOTES
"Chief Complaint  Medicare Wellness-subsequent and Hypertension    Subjective          Sosa Diamond presents to [facility]  Patient has a fair amount of situational anxiety and chronic back pain with facet arthropathy and right leg sciatica.  Previous MRIs are reviewed.  She is seeing pain management.  Epidurals do help.  She cannot take Cymbalta.    Blood pressure readings at home are labile related to anxiety.  She has some insomnia at times.  Occasional lightheadedness appears not to be related to heart rate.  We discussed options as far as anxiety related physical problems and blood pressure and will try rare as needed Xanax po 0.5 mg twice daily as needed.  She may break it in half.    She has beginnings of an ingrown toenail right great toe of advise follow-up with podiatry.  There is evidence of onychomycosis she will soak the foot in Nizoral shampoo or Selsun Blue shampoo.      Objective   Vital Signs:   /80   Pulse 92   Temp 98 °F (36.7 °C) (Temporal)   Ht 172.7 cm (67.99\")   Wt 88.9 kg (196 lb)   SpO2 99%   BMI 29.81 kg/m²     Physical Exam  Vitals reviewed.   Constitutional:       Appearance: She is well-developed.   HENT:      Head: Normocephalic and atraumatic.      Right Ear: External ear normal. Tympanic membrane is bulging. Tympanic membrane has decreased mobility.      Left Ear: Tympanic membrane and external ear normal.      Nose: Nose normal.   Eyes:      Conjunctiva/sclera: Conjunctivae normal.      Pupils: Pupils are equal, round, and reactive to light.   Neck:      Thyroid: No thyromegaly.      Vascular: No JVD.   Cardiovascular:      Rate and Rhythm: Normal rate and regular rhythm.      Heart sounds: Normal heart sounds.   Pulmonary:      Effort: Pulmonary effort is normal.      Breath sounds: Normal breath sounds.   Abdominal:      General: Bowel sounds are normal.      Palpations: Abdomen is soft.   Musculoskeletal:      Cervical back: Normal range " of motion and neck supple.      Lumbar back: Decreased range of motion.        Feet:    Lymphadenopathy:      Cervical: No cervical adenopathy.   Skin:     General: Skin is warm and dry.      Findings: No rash.   Neurological:      Mental Status: She is alert and oriented to person, place, and time.      Cranial Nerves: No cranial nerve deficit.      Coordination: Coordination normal.   Psychiatric:         Behavior: Behavior normal.         Thought Content: Thought content normal.         Judgment: Judgment normal.        Result Review :                 Assessment and Plan    Diagnoses and all orders for this visit:    1. Facet arthritis of lumbar region (Primary)  Comments:  Follow-up with pain management   Orders:  -     Comprehensive Metabolic Panel  -     CBC & Differential  -     Lipid Panel With / Chol / HDL Ratio  -     Urinalysis With Microscopic If Indicated (No Culture) - Urine, Clean Catch  -     Vitamin B12  -     TSH  -     T4, Free  -     Hemoglobin A1c  -     Vitamin D 25 Hydroxy    2. Right chronic serous otitis media  Comments:  Zithromax Z-TELMA 250    3. Ingrown toenail of right foot  Comments:  Advised referral to podiatry    4. Anxiety  Comments:  Rare Xanax 0.5 1 twice daily as needed.  Orders:  -     ALPRAZolam (Xanax) 0.5 MG tablet; Take 1 tablet by mouth 2 (Two) Times a Day As Needed for Anxiety.  Dispense: 30 tablet; Refill: 1    5. Mixed hyperlipidemia  Comments:  Check lipid panel  Orders:  -     Comprehensive Metabolic Panel  -     CBC & Differential  -     Lipid Panel With / Chol / HDL Ratio  -     Urinalysis With Microscopic If Indicated (No Culture) - Urine, Clean Catch  -     Vitamin B12  -     TSH  -     T4, Free  -     Hemoglobin A1c  -     Vitamin D 25 Hydroxy    6. Fatty liver  -     Comprehensive Metabolic Panel  -     CBC & Differential  -     Lipid Panel With / Chol / HDL Ratio  -     Urinalysis With Microscopic If Indicated (No Culture) - Urine, Clean Catch  -     Vitamin  B12  -     TSH  -     T4, Free  -     Hemoglobin A1c  -     Vitamin D 25 Hydroxy    7. Chronic right-sided low back pain with right-sided sciatica    8. Medicare annual wellness visit, subsequent    9. Labile blood pressure  Comments:  Related to anxiety  Orders:  -     ALPRAZolam (Xanax) 0.5 MG tablet; Take 1 tablet by mouth 2 (Two) Times a Day As Needed for Anxiety.  Dispense: 30 tablet; Refill: 1  -     Comprehensive Metabolic Panel  -     CBC & Differential  -     Lipid Panel With / Chol / HDL Ratio  -     Urinalysis With Microscopic If Indicated (No Culture) - Urine, Clean Catch  -     Vitamin B12  -     TSH  -     T4, Free  -     Hemoglobin A1c  -     Vitamin D 25 Hydroxy    10. Hypovitaminosis D  -     Vitamin D 25 Hydroxy    11. Elevated glucose   -     Hemoglobin A1c    12. Close exposure to COVID-19 virus  Comments:  Check antibody spike  Orders:  -     SARS-CoV-2 Semi-Quant Total Ab; Future    Other orders  -     azithromycin (Zithromax) 250 MG tablet; Take 2 tablets the first day, then 1 tablet daily for 4 days.  Dispense: 6 tablet; Refill: 0        Follow Up   Return in about 6 months (around 7/28/2022).  Patient was given instructions and counseling regarding her condition or for health maintenance advice. Please see specific information pulled into the AVS if appropriate.

## 2022-01-28 NOTE — PATIENT INSTRUCTIONS
Medicare Wellness  Personal Prevention Plan of Service     Date of Office Visit:  2022  Encounter Provider:  Zaheer Mccrary Jr, MD  Place of Service:  [facility]  Patient Name: Sosa Diamond  :  1950    As part of the Medicare Wellness portion of your visit today, we are providing you with this personalized preventive plan of services (PPPS). This plan is based upon recommendations of the [place] Preventive Services Task Force (USPSTF) and the Advisory Committee on Immunization Practices (ACIP).    This lists the preventive care services that should be considered, and provides dates of when you are due. Items listed as completed are up-to-date and do not require any further intervention.    Health Maintenance   Topic Date Due   • HEPATITIS C SCREENING  Never done   • PAP SMEAR  Never done   • ZOSTER VACCINE (2 of 3) 2017   • Pneumococcal Vaccine 65+ (2 of 2 - PPSV23) 2020   • DXA SCAN  2020   • ANNUAL WELLNESS VISIT  2022   • LIPID PANEL  2022   • MAMMOGRAM  2023   • TDAP/TD VACCINES (2 - Td or Tdap) 10/09/2025   • COLORECTAL CANCER SCREENING  2026   • COVID-19 Vaccine  Completed   • INFLUENZA VACCINE  Completed       Orders Placed This Encounter   Procedures   • Comprehensive Metabolic Panel     Order Specific Question:   Release to patient     Answer:   Immediate   • Lipid Panel With / Chol / HDL Ratio     Order Specific Question:   Release to patient     Answer:   Immediate   • Urinalysis With Microscopic If Indicated (No Culture) - Urine, Clean Catch     Order Specific Question:   Release to patient     Answer:   Immediate   • Vitamin B12     Order Specific Question:   Release to patient     Answer:   Immediate   • TSH     Order Specific Question:   Release to patient     Answer:   Immediate   • T4, Free     Order Specific Question:   Release to patient     Answer:   Immediate   • Hemoglobin A1c     Order Specific  Question:   Release to patient     Answer:   Immediate   • Vitamin D 25 Hydroxy     Order Specific Question:   Release to patient     Answer:   Immediate   • CBC & Differential     Order Specific Question:   Manual Differential     Answer:   No       No follow-ups on file.

## 2022-01-29 LAB
25(OH)D3+25(OH)D2 SERPL-MCNC: 46.3 NG/ML (ref 30–100)
ALBUMIN SERPL-MCNC: 4.7 G/DL (ref 3.8–4.8)
ALBUMIN/GLOB SERPL: 2.1 {RATIO} (ref 1.2–2.2)
ALP SERPL-CCNC: 72 IU/L (ref 44–121)
ALT SERPL-CCNC: 34 IU/L (ref 0–32)
APPEARANCE UR: CLEAR
AST SERPL-CCNC: 28 IU/L (ref 0–40)
BASOPHILS # BLD AUTO: 0.1 X10E3/UL (ref 0–0.2)
BASOPHILS NFR BLD AUTO: 1 %
BILIRUB SERPL-MCNC: 0.4 MG/DL (ref 0–1.2)
BILIRUB UR QL STRIP: NEGATIVE
BUN SERPL-MCNC: 10 MG/DL (ref 8–27)
BUN/CREAT SERPL: 14 (ref 12–28)
CALCIUM SERPL-MCNC: 10.3 MG/DL (ref 8.7–10.3)
CHLORIDE SERPL-SCNC: 103 MMOL/L (ref 96–106)
CHOLEST SERPL-MCNC: 210 MG/DL (ref 100–199)
CHOLEST/HDLC SERPL: 4.2 RATIO (ref 0–4.4)
CO2 SERPL-SCNC: 23 MMOL/L (ref 20–29)
COLOR UR: YELLOW
CREAT SERPL-MCNC: 0.7 MG/DL (ref 0.57–1)
EOSINOPHIL # BLD AUTO: 0 X10E3/UL (ref 0–0.4)
EOSINOPHIL NFR BLD AUTO: 1 %
ERYTHROCYTE [DISTWIDTH] IN BLOOD BY AUTOMATED COUNT: 12.7 % (ref 11.7–15.4)
GLOBULIN SER CALC-MCNC: 2.2 G/DL (ref 1.5–4.5)
GLUCOSE SERPL-MCNC: 97 MG/DL (ref 65–99)
GLUCOSE UR QL STRIP: NEGATIVE
HBA1C MFR BLD: 4.9 % (ref 4.8–5.6)
HCT VFR BLD AUTO: 43.4 % (ref 34–46.6)
HDLC SERPL-MCNC: 50 MG/DL
HGB BLD-MCNC: 14.5 G/DL (ref 11.1–15.9)
HGB UR QL STRIP: NEGATIVE
IMM GRANULOCYTES # BLD AUTO: 0 X10E3/UL (ref 0–0.1)
IMM GRANULOCYTES NFR BLD AUTO: 0 %
KETONES UR QL STRIP: NEGATIVE
LDLC SERPL CALC-MCNC: 127 MG/DL (ref 0–99)
LEUKOCYTE ESTERASE UR QL STRIP: NEGATIVE
LYMPHOCYTES # BLD AUTO: 2.2 X10E3/UL (ref 0.7–3.1)
LYMPHOCYTES NFR BLD AUTO: 32 %
MCH RBC QN AUTO: 32.5 PG (ref 26.6–33)
MCHC RBC AUTO-ENTMCNC: 33.4 G/DL (ref 31.5–35.7)
MCV RBC AUTO: 97 FL (ref 79–97)
MICRO URNS: ABNORMAL
MONOCYTES # BLD AUTO: 0.5 X10E3/UL (ref 0.1–0.9)
MONOCYTES NFR BLD AUTO: 7 %
NEUTROPHILS # BLD AUTO: 4.2 X10E3/UL (ref 1.4–7)
NEUTROPHILS NFR BLD AUTO: 59 %
NITRITE UR QL STRIP: NEGATIVE
PH UR STRIP: 6 [PH] (ref 5–7.5)
PLATELET # BLD AUTO: 319 X10E3/UL (ref 150–450)
POTASSIUM SERPL-SCNC: 5.3 MMOL/L (ref 3.5–5.2)
PROT SERPL-MCNC: 6.9 G/DL (ref 6–8.5)
PROT UR QL STRIP: NEGATIVE
RBC # BLD AUTO: 4.46 X10E6/UL (ref 3.77–5.28)
SARS-COV-2 AB SERPL QL IA: NEGATIVE
SODIUM SERPL-SCNC: 139 MMOL/L (ref 134–144)
SP GR UR STRIP: <=1.005 (ref 1–1.03)
T4 FREE SERPL-MCNC: 0.97 NG/DL (ref 0.82–1.77)
TRIGL SERPL-MCNC: 189 MG/DL (ref 0–149)
TSH SERPL-ACNC: 2.17 UIU/ML (ref 0.45–4.5)
UROBILINOGEN UR STRIP-MCNC: 0.2 MG/DL (ref 0.2–1)
VIT B12 SERPL-MCNC: 564 PG/ML (ref 232–1245)
VLDLC SERPL CALC-MCNC: 33 MG/DL (ref 5–40)
WBC # BLD AUTO: 7.1 X10E3/UL (ref 3.4–10.8)

## 2022-03-09 DIAGNOSIS — K21.9 GASTROESOPHAGEAL REFLUX DISEASE: ICD-10-CM

## 2022-03-09 RX ORDER — OMEPRAZOLE 40 MG/1
CAPSULE, DELAYED RELEASE ORAL
Qty: 90 CAPSULE | Refills: 0 | Status: SHIPPED | OUTPATIENT
Start: 2022-03-09 | End: 2022-06-09 | Stop reason: SDUPTHER

## 2022-06-06 DIAGNOSIS — K21.9 GASTROESOPHAGEAL REFLUX DISEASE: ICD-10-CM

## 2022-06-07 RX ORDER — OMEPRAZOLE 40 MG/1
CAPSULE, DELAYED RELEASE ORAL
Qty: 90 CAPSULE | Refills: 0 | OUTPATIENT
Start: 2022-06-07

## 2022-06-08 DIAGNOSIS — K21.9 GASTROESOPHAGEAL REFLUX DISEASE: ICD-10-CM

## 2022-06-08 RX ORDER — OMEPRAZOLE 40 MG/1
CAPSULE, DELAYED RELEASE ORAL
Qty: 90 CAPSULE | Refills: 0 | OUTPATIENT
Start: 2022-06-08

## 2022-06-09 ENCOUNTER — OFFICE VISIT (OUTPATIENT)
Dept: GASTROENTEROLOGY | Facility: CLINIC | Age: 72
End: 2022-06-09

## 2022-06-09 VITALS
TEMPERATURE: 96.3 F | SYSTOLIC BLOOD PRESSURE: 132 MMHG | HEIGHT: 68 IN | WEIGHT: 197.2 LBS | HEART RATE: 85 BPM | BODY MASS INDEX: 29.89 KG/M2 | DIASTOLIC BLOOD PRESSURE: 86 MMHG

## 2022-06-09 DIAGNOSIS — K21.9 GASTROESOPHAGEAL REFLUX DISEASE WITHOUT ESOPHAGITIS: Primary | ICD-10-CM

## 2022-06-09 DIAGNOSIS — Z87.19 HISTORY OF GASTRITIS: ICD-10-CM

## 2022-06-09 DIAGNOSIS — D12.6 ADENOMATOUS POLYP OF COLON, UNSPECIFIED PART OF COLON: ICD-10-CM

## 2022-06-09 DIAGNOSIS — K57.90 DIVERTICULOSIS: ICD-10-CM

## 2022-06-09 DIAGNOSIS — K21.9 GASTROESOPHAGEAL REFLUX DISEASE: ICD-10-CM

## 2022-06-09 PROCEDURE — 99213 OFFICE O/P EST LOW 20 MIN: CPT | Performed by: NURSE PRACTITIONER

## 2022-06-09 RX ORDER — OMEPRAZOLE 40 MG/1
40 CAPSULE, DELAYED RELEASE ORAL DAILY
Qty: 90 CAPSULE | Refills: 3 | Status: SHIPPED | OUTPATIENT
Start: 2022-06-09

## 2022-06-09 NOTE — PROGRESS NOTES
Chief Complaint   Patient presents with   • Heartburn       Sosa Diamond is a  72 y.o. female here for a follow up visit for GERD.    HPI  72-year-old female presents today for follow-up visit for GERD.  She is a patient of Dr. Greenwood.  She was last seen in the office by him on 2/24/2021.  She is new to me today.  She underwent EGD and colonoscopy on 4/16/2021.  EGD showed gastric polyp and gastritis.  Path was negative.  Colonoscopy showed diverticulosis, nonbleeding internal hemorrhoids and 1 adenomatous colon polyp.  Recall in 5 years.  She has a history of GERD/gastritis and admits she does really well on omeprazole 40 mg daily.  She denies any breakthrough reflux at this time.  She reports her bowels move pretty well most the time.  Sometimes she gets backed up and will use smooth move senna tea as needed and it really seems to help.  She denies any dysphagia, reflux, abdominal pain, nausea and vomiting, diarrhea, rectal bleeding or melena.  She admits her appetite is good and her weight is stable.  She denies any significant GI family history at this time.  She does have a history of diverticulitis and has undergone colon resection in the past.  Past Medical History:   Diagnosis Date   • Allergic    • Back pain     left when sleep on stomach  chronic   • Chicken pox     childhood   • Condition not found     VASOMOTOR EPISODES   • Constipation     chronic   • Decreased visual acuity    • Diverticulitis    • Diverticulosis    • Eye pain     have been having probelsm with left eye   • Fatigue     Fatigue / loss of energy   • Flushing    • GERD (gastroesophageal reflux disease)     resolved with omeprazole   • Hayfever    • Heartburn    • Hemorrhoids     25 years ago   • Hernia 1999   • Herpes    • History of CT scan of abdomen 07/30/2013   • History of EKG     10/26/2015 NORMAL / NO CHANGE  10/09/14   • History of mammogram     Date of last mammogram 09/2015. Mammogram Normal.  05/01/2014 DR MANZANARES   •  History of Papanicolaou smear of cervix     Date of last PAP test: 09/2015. Pap Normal.   • History of TB skin testing 10/09/2014    TB Skin Test   • Hyperlipidemia    • Measles     childhood   • Melanoma (HCC)     right leg   • Mumps     childhood   • Night sweats    • Pregnancy     Number of Pregnancies: 3.  Number of Miscarriages: 1.  Number of Live Births: 2.   • Sinus trouble    • Skin melanoma (HCC) 03/2014    Skin cancer - melanoma - 3/2014   • Sleep trouble     Sleep problems has hot flashes, night sweats   • Visual impairment    • Weight gain        Past Surgical History:   Procedure Laterality Date   • CATARACT EXTRACTION, BILATERAL Bilateral 02/2021   • COLON RESECTION  12/15/2010    Event: laparoscopic sigmoid colectomy  Provider: Elizabeth Rudd MD   • COLONOSCOPY  08/19/2006    multiple sigm tics, random path neg   • COLONOSCOPY  2006   • COLONOSCOPY N/A 4/16/2021    Procedure: COLONOSCOPY TO CECUM WITH POLYPECTOMY (COLD) and NS INJECTION (1CC);  Surgeon: Vincent Greenwood MD;  Location: [facility] ENDOSCOPY;  Service: Gastroenterology;  Laterality: N/A;  PREOP/ HX OF DIVERTICULITIS  POSTOP/ DIVERTICULOSIS, POLYPS, INTERNAL HEMORRHOIDS   • ENDOSCOPY  08/19/2006    EGD-erythamatous gastropathy, neg celiac, neg h pylori   • ENDOSCOPY N/A 4/16/2021    Procedure: ESOPHAGOGASTRODUODENOSCOPY WITH BX;  Surgeon: Vincent Greenwood MD;  Location: [facility] ENDOSCOPY;  Service: Gastroenterology;  Laterality: N/A;  PREOP/ DYSPEPSIA  POSTOP/ GASTRIC POLYP, GASTRITIS   • FEMORAL HERNIA REPAIR  2000   • HEMORRHOIDECTOMY     • HERNIA REPAIR Right 1999    hernia in R thigh   • OTHER SURGICAL HISTORY Right     MELANOMA RIGHT LEG   • PAP SMEAR  05/01/2014    DR MANZANARES       Scheduled Meds:    Continuous Infusions:No current facility-administered medications for this visit.      PRN Meds:.    Allergies   Allergen Reactions   • Doxycycline Itching       Social History     Socioeconomic History   • Marital status:    Tobacco Use    • Smoking status: Never Smoker   • Smokeless tobacco: Never Used   Vaping Use   • Vaping Use: Never used   Substance and Sexual Activity   • Alcohol use: Yes     Alcohol/week: 10.0 standard drinks     Types: 10 Glasses of wine per week     Comment: daily   • Drug use: No   • Sexual activity: Defer       Family History   Problem Relation Age of Onset   • Arthritis Father         osteoarthritis   • Cancer Father         esophageal   • Hypertension Father    • Ulcers Father         gastric   • Glaucoma Father         also macular degeneration   • Alcohol abuse Father    • Colon polyps Father         colon polyps but no cancer   • Arthritis Paternal Grandmother         rheumatoid   • Alcohol abuse Paternal Uncle    • Breast cancer Paternal Aunt    • Arthritis Maternal Grandmother    • Malig Hyperthermia Neg Hx        Review of Systems   Constitutional: Negative for appetite change, chills, diaphoresis, fatigue, fever and unexpected weight change.   HENT: Negative for nosebleeds, postnasal drip, sore throat, trouble swallowing and voice change.    Respiratory: Negative for cough, choking, chest tightness, shortness of breath, wheezing and stridor.    Cardiovascular: Negative for chest pain, palpitations and leg swelling.   Gastrointestinal: Positive for constipation. Negative for abdominal distention, abdominal pain, anal bleeding, blood in stool, diarrhea, nausea, rectal pain and vomiting.   Endocrine: Negative for polydipsia, polyphagia and polyuria.   Musculoskeletal: Negative for gait problem.   Skin: Negative for rash and wound.   Allergic/Immunologic: Negative for food allergies.   Neurological: Negative for dizziness, speech difficulty and light-headedness.   Psychiatric/Behavioral: Negative for confusion, self-injury, sleep disturbance and suicidal ideas.       Vitals:    06/09/22 1514   BP: 132/86   Pulse: 85   Temp: 96.3 °F (35.7 °C)       Physical Exam  Constitutional:       General: She is not in acute  distress.     Appearance: She is well-developed. She is not ill-appearing.   HENT:      Head: Normocephalic.   Eyes:      Pupils: Pupils are equal, round, and reactive to light.   Cardiovascular:      Rate and Rhythm: Normal rate and regular rhythm.      Heart sounds: Normal heart sounds.   Pulmonary:      Effort: Pulmonary effort is normal.      Breath sounds: Normal breath sounds.   Abdominal:      General: Bowel sounds are normal. There is no distension.      Palpations: Abdomen is soft. There is no mass.      Tenderness: There is no abdominal tenderness. There is no guarding or rebound.      Hernia: No hernia is present.   Musculoskeletal:         General: Normal range of motion.   Skin:     General: Skin is warm and dry.   Neurological:      Mental Status: She is alert and oriented to person, place, and time.   Psychiatric:         Speech: Speech normal.         Behavior: Behavior normal.         Judgment: Judgment normal.         No radiology results for the last 7 days     Diagnoses and all orders for this visit:    1. Gastroesophageal reflux disease without esophagitis (Primary)  Overview:  resolved with omeprazole      2. History of gastritis    3. Diverticulosis  Overview:  Added automatically from request for surgery [id number]      4. Adenomatous polyp of colon, unspecified part of colon    5. Gastroesophageal reflux disease  Overview:  Added automatically from request for surgery [id number]    Orders:  -     omeprazole (priLOSEC) 40 MG capsule; Take 1 capsule by mouth Daily.  Dispense: 90 capsule; Refill: 3     Reviewed EGD and colonoscopy results with her today.  GERD seems well controlled on omeprazole 40 mg daily.  Continue GERD precautions.  Bowels are moving well on smooth move senna tea as needed.  Overall she seems to be doing really well.  Next screening colonoscopy will be due in 2026.  Patient to call the office with any issues.  Patient to follow-up with me in 1 year.  Patient is agreeable to the  plan.

## 2022-07-07 ENCOUNTER — TELEPHONE (OUTPATIENT)
Dept: INTERNAL MEDICINE | Facility: CLINIC | Age: 72
End: 2022-07-07

## 2022-07-07 NOTE — TELEPHONE ENCOUNTER
----- Message from Sosa Diamond sent at 7/6/2022  3:11 PM EDT -----  Regarding: Alprazolam 0.5mg  Could you please refill this medication?   I have appointment end of July. Thank you

## 2022-07-29 ENCOUNTER — OFFICE VISIT (OUTPATIENT)
Dept: INTERNAL MEDICINE | Facility: CLINIC | Age: 72
End: 2022-07-29

## 2022-07-29 VITALS
OXYGEN SATURATION: 98 % | BODY MASS INDEX: 29.04 KG/M2 | HEART RATE: 75 BPM | DIASTOLIC BLOOD PRESSURE: 88 MMHG | WEIGHT: 191 LBS | TEMPERATURE: 98 F | SYSTOLIC BLOOD PRESSURE: 134 MMHG

## 2022-07-29 DIAGNOSIS — F41.9 ANXIETY: ICD-10-CM

## 2022-07-29 DIAGNOSIS — K21.9 GASTROESOPHAGEAL REFLUX DISEASE WITHOUT ESOPHAGITIS: Primary | ICD-10-CM

## 2022-07-29 DIAGNOSIS — R09.89 LABILE BLOOD PRESSURE: ICD-10-CM

## 2022-07-29 PROCEDURE — 99214 OFFICE O/P EST MOD 30 MIN: CPT | Performed by: FAMILY MEDICINE

## 2022-07-29 RX ORDER — ALPRAZOLAM 0.5 MG/1
0.5 TABLET ORAL NIGHTLY PRN
Qty: 30 TABLET | Refills: 5 | Status: SHIPPED | OUTPATIENT
Start: 2022-07-29 | End: 2023-01-26

## 2022-07-29 NOTE — PROGRESS NOTES
"Chief Complaint  Hyperlipidemia, Heartburn, and Anxiety    Subjective        Sosa Diamond presents to [facility]  Pleasant lady with a history of chronic back pain relieved with occasional epidurals.  She has some gastroesophageal reflux disease were following borderline hyperlipidemia.  We will get labs in next visit.    Otherwise refill alprazolam for situational anxiety based on family members transitions.      Objective   Vital Signs:  /88 (BP Location: Right arm, Patient Position: Sitting, Cuff Size: Adult)   Pulse 75   Temp 98 °F (36.7 °C) (Tympanic)   Wt 86.6 kg (191 lb)   SpO2 98%   BMI 29.04 kg/m²   Estimated body mass index is 29.04 kg/m² as calculated from the following:    Height as of 6/9/22: 172.7 cm (68\").    Weight as of this encounter: 86.6 kg (191 lb).          Physical Exam  Vitals reviewed.   Constitutional:       Appearance: She is well-developed.   HENT:      Head: Normocephalic and atraumatic.      Right Ear: Tympanic membrane and external ear normal.      Left Ear: Tympanic membrane and external ear normal.      Nose: Nose normal.   Eyes:      Conjunctiva/sclera: Conjunctivae normal.      Pupils: Pupils are equal, round, and reactive to light.   Neck:      Thyroid: No thyromegaly.      Vascular: No JVD.   Cardiovascular:      Rate and Rhythm: Normal rate and regular rhythm.      Heart sounds: Normal heart sounds.   Pulmonary:      Effort: Pulmonary effort is normal.      Breath sounds: Normal breath sounds.   Abdominal:      General: Bowel sounds are normal.      Palpations: Abdomen is soft.   Musculoskeletal:      Cervical back: Normal range of motion and neck supple.      Lumbar back: Decreased range of motion.   Lymphadenopathy:      Cervical: No cervical adenopathy.   Skin:     General: Skin is warm and dry.      Findings: No rash.   Neurological:      Mental Status: She is alert and oriented to person, place, and time.      Cranial Nerves: No " cranial nerve deficit.      Coordination: Coordination normal.   Psychiatric:         Behavior: Behavior normal.         Thought Content: Thought content normal.         Judgment: Judgment normal.        Result Review :  The following data was reviewed by: Zaheer Mccrary MD on 07/29/2022:  Common labs    Common Labsle 1/28/22 1/28/22 1/28/22 1/28/22    1127 1127 1127 1127   Glucose 97      BUN 10      Creatinine 0.70      eGFR Non  Am 89      eGFR African Am 102      Sodium 139      Potassium 5.3 (A)      Chloride 103      Calcium 10.3      Total Protein 6.9      Albumin 4.7      Total Bilirubin 0.4      Alkaline Phosphatase 72      AST (SGOT) 28      ALT (SGPT) 34 (A)      WBC  7.1     Hemoglobin  14.5     Hematocrit  43.4     Platelets  319     Total Cholesterol   210 (A)    Triglycerides   189 (A)    HDL Cholesterol   50    LDL Cholesterol    127 (A)    Hemoglobin A1C    4.9   (A) Abnormal value       Comments are available for some flowsheets but are not being displayed.                     Assessment and Plan   Diagnoses and all orders for this visit:    1. Gastroesophageal reflux disease without esophagitis (Primary)    2. Anxiety  Comments:  Rare Xanax 0.5 1 twice daily as needed.  Orders:  -     ALPRAZolam (Xanax) 0.5 MG tablet; Take 1 tablet by mouth At Night As Needed for Anxiety or Sleep.  Dispense: 30 tablet; Refill: 5    3. Labile blood pressure  Comments:  Related to anxiety  Orders:  -     ALPRAZolam (Xanax) 0.5 MG tablet; Take 1 tablet by mouth At Night As Needed for Anxiety or Sleep.  Dispense: 30 tablet; Refill: 5             Follow Up   No follow-ups on file.  Patient was given instructions and counseling regarding her condition or for health maintenance advice. Please see specific information pulled into the AVS if appropriate.       Answers for HPI/ROS submitted by the patient on 7/27/2022  What is the primary reason for your visit?: Physical

## 2023-01-26 DIAGNOSIS — F41.9 ANXIETY: ICD-10-CM

## 2023-01-26 DIAGNOSIS — R09.89 LABILE BLOOD PRESSURE: ICD-10-CM

## 2023-01-26 RX ORDER — ALPRAZOLAM 0.5 MG/1
TABLET ORAL
Qty: 30 TABLET | Refills: 0 | Status: SHIPPED | OUTPATIENT
Start: 2023-01-26 | End: 2023-02-08 | Stop reason: SDUPTHER

## 2023-01-26 NOTE — TELEPHONE ENCOUNTER
Dr Mccrary pt  Contract 7/29/22    Rx Refill Note  Requested Prescriptions     Pending Prescriptions Disp Refills   • ALPRAZolam (XANAX) 0.5 MG tablet [Pharmacy Med Name: ALPRAZolam 0.5 MG TABLET] 30 tablet      Sig: TAKE ONE TABLET BY MOUTH ONCE NIGHTLY AS NEEDED FOR ANXIETY OR SLEEP      Last office visit with prescribing clinician: 7/29/2022   Last telemedicine visit with prescribing clinician: 2/8/2023   Next office visit with prescribing clinician: 2/8/2023       Maye Storey MA  01/26/23, 16:26 EST

## 2023-02-08 ENCOUNTER — OFFICE VISIT (OUTPATIENT)
Dept: INTERNAL MEDICINE | Facility: CLINIC | Age: 73
End: 2023-02-08
Payer: MEDICARE

## 2023-02-08 VITALS
SYSTOLIC BLOOD PRESSURE: 138 MMHG | OXYGEN SATURATION: 97 % | HEART RATE: 77 BPM | WEIGHT: 190 LBS | DIASTOLIC BLOOD PRESSURE: 98 MMHG | TEMPERATURE: 98.4 F | BODY MASS INDEX: 28.89 KG/M2

## 2023-02-08 DIAGNOSIS — M47.816 FACET ARTHRITIS OF LUMBAR REGION: ICD-10-CM

## 2023-02-08 DIAGNOSIS — S83.203S OTHER TEAR OF MENISCUS OF RIGHT KNEE, UNSPECIFIED MENISCUS, UNSPECIFIED WHETHER OLD OR CURRENT TEAR, SEQUELA: ICD-10-CM

## 2023-02-08 DIAGNOSIS — F41.9 ANXIETY: ICD-10-CM

## 2023-02-08 DIAGNOSIS — R09.89 LABILE BLOOD PRESSURE: ICD-10-CM

## 2023-02-08 DIAGNOSIS — Z00.00 MEDICARE ANNUAL WELLNESS VISIT, SUBSEQUENT: ICD-10-CM

## 2023-02-08 DIAGNOSIS — R03.0 ELEVATED BLOOD PRESSURE READING: ICD-10-CM

## 2023-02-08 DIAGNOSIS — R73.09 ELEVATED GLUCOSE: ICD-10-CM

## 2023-02-08 DIAGNOSIS — E78.2 MIXED HYPERLIPIDEMIA: Primary | ICD-10-CM

## 2023-02-08 DIAGNOSIS — K21.9 GASTROESOPHAGEAL REFLUX DISEASE WITHOUT ESOPHAGITIS: ICD-10-CM

## 2023-02-08 PROBLEM — S83.206A TEAR OF MENISCUS OF RIGHT KNEE: Status: ACTIVE | Noted: 2023-02-08

## 2023-02-08 PROCEDURE — 1126F AMNT PAIN NOTED NONE PRSNT: CPT | Performed by: FAMILY MEDICINE

## 2023-02-08 PROCEDURE — 99214 OFFICE O/P EST MOD 30 MIN: CPT | Performed by: FAMILY MEDICINE

## 2023-02-08 PROCEDURE — 1159F MED LIST DOCD IN RCRD: CPT | Performed by: FAMILY MEDICINE

## 2023-02-08 PROCEDURE — 1170F FXNL STATUS ASSESSED: CPT | Performed by: FAMILY MEDICINE

## 2023-02-08 PROCEDURE — G0439 PPPS, SUBSEQ VISIT: HCPCS | Performed by: FAMILY MEDICINE

## 2023-02-08 RX ORDER — ALPRAZOLAM 0.5 MG/1
0.5 TABLET ORAL NIGHTLY PRN
Qty: 30 TABLET | Refills: 3 | Status: SHIPPED | OUTPATIENT
Start: 2023-02-08

## 2023-02-08 RX ORDER — AZELAIC ACID 0.15 G/G
GEL TOPICAL
COMMUNITY
Start: 2022-11-10

## 2023-02-08 RX ORDER — AMLODIPINE BESYLATE 2.5 MG/1
2.5 TABLET ORAL DAILY
Qty: 90 TABLET | Refills: 2 | Status: SHIPPED | OUTPATIENT
Start: 2023-02-08

## 2023-02-08 NOTE — PATIENT INSTRUCTIONS
Medicare Wellness  Personal Prevention Plan of Service     Date of Office Visit:    Encounter Provider:  Zaheer Mccrary MD  Place of Service:  [facility]  Patient Name: Sosa Diamond  :  1950    As part of the Medicare Wellness portion of your visit today, we are providing you with this personalized preventive plan of services (PPPS). This plan is based upon recommendations of the [place] Preventive Services Task Force (USPSTF) and the Advisory Committee on Immunization Practices (ACIP).    This lists the preventive care services that should be considered, and provides dates of when you are due. Items listed as completed are up-to-date and do not require any further intervention.    Health Maintenance   Topic Date Due    DXA SCAN  Never done    HEPATITIS C SCREENING  Never done    PAP SMEAR  Never done    ZOSTER VACCINE (2 of 3) 2017    Pneumococcal Vaccine 65+ (2 - PPSV23 if available, else PCV20) 2020    LIPID PANEL  2023    ANNUAL WELLNESS VISIT  2024    MAMMOGRAM  2025    TDAP/TD VACCINES (2 - Td or Tdap) 10/09/2025    COLORECTAL CANCER SCREENING  2026    COVID-19 Vaccine  Completed    INFLUENZA VACCINE  Completed       Orders Placed This Encounter   Procedures    Urinalysis With Microscopic If Indicated (No Culture) - Urine, Clean Catch    TSH    T4, Free    Lipid Panel With / Chol / HDL Ratio    Comprehensive Metabolic Panel     Order Specific Question:   Release to patient     Answer:   Immediate    Hemoglobin A1c    Vitamin B12     Order Specific Question:   Release to patient     Answer:   Immediate    CBC & Differential     Order Specific Question:   Manual Differential     Answer:   No       No follow-ups on file.

## 2023-02-08 NOTE — PROGRESS NOTES
"Chief Complaint  Medicare Wellness-subsequent    Subjective        Sosa Diamond presents to [facility]  History of Present Illness  Sosa  overall is doing well and she is doing great on exercise regimen.  She had a prior facet arthropathy and has worked her way through torn meniscus on the right knee as well as a prior meniscal problem on the left knee.  Her blood pressure is labile.  We discussed alternatives and we will try low-dose amlodipine 2.5 mg daily recheck in 6 months.    Labs to be drawn today in reference to cholesterol and kidney function as well as blood count and thyroid.  Prior colonoscopy was reviewed as well.    I refilled the tiny dose of as needed alprazolam/Xanax at bedtime as needed for sleep/anxiety.    We also discussed the need for Shingrix/shingles vaccination.      Objective   Vital Signs:  /98 (BP Location: Left arm, Patient Position: Sitting, Cuff Size: Adult) Comment: 134/84  Pulse 77   Temp 98.4 °F (36.9 °C) (Tympanic)   Wt 86.2 kg (190 lb)   SpO2 97%   BMI 28.89 kg/m²   Estimated body mass index is 28.89 kg/m² as calculated from the following:    Height as of 6/9/22: 172.7 cm (68\").    Weight as of this encounter: 86.2 kg (190 lb).             Physical Exam  Vitals reviewed.   Constitutional:       Appearance: She is well-developed.   HENT:      Head: Normocephalic and atraumatic.      Right Ear: Tympanic membrane and external ear normal.      Left Ear: Tympanic membrane and external ear normal.      Nose: Nose normal.   Eyes:      Conjunctiva/sclera: Conjunctivae normal.      Pupils: Pupils are equal, round, and reactive to light.   Neck:      Thyroid: No thyromegaly.      Vascular: No JVD.   Cardiovascular:      Rate and Rhythm: Normal rate and regular rhythm.      Heart sounds: Normal heart sounds.   Pulmonary:      Effort: Pulmonary effort is normal.      Breath sounds: Normal breath sounds.   Abdominal:      General: Bowel sounds are " normal.      Palpations: Abdomen is soft.   Musculoskeletal:      Cervical back: Normal range of motion and neck supple.      Lumbar back: Decreased range of motion.   Lymphadenopathy:      Cervical: No cervical adenopathy.   Skin:     General: Skin is warm and dry.      Findings: No rash.   Neurological:      Mental Status: She is alert and oriented to person, place, and time.      Cranial Nerves: No cranial nerve deficit.      Coordination: Coordination normal.   Psychiatric:         Behavior: Behavior normal.         Thought Content: Thought content normal.         Judgment: Judgment normal.        Result Review :                   Assessment and Plan   Diagnoses and all orders for this visit:    1. Mixed hyperlipidemia (Primary)  -     Urinalysis With Microscopic If Indicated (No Culture) - Urine, Clean Catch  -     TSH  -     T4, Free  -     Lipid Panel With / Chol / HDL Ratio  -     CBC & Differential  -     Comprehensive Metabolic Panel  -     Hemoglobin A1c  -     Vitamin B12    2. Gastroesophageal reflux disease without esophagitis    3. Facet arthritis of lumbar region    4. Other tear of meniscus of right knee, unspecified meniscus, unspecified whether old or current tear, sequela    5. Medicare annual wellness visit, subsequent    6. Anxiety  Comments:  Rare Xanax 0.5 1 twice daily as needed.  Orders:  -     ALPRAZolam (XANAX) 0.5 MG tablet; Take 1 tablet by mouth At Night As Needed for Anxiety.  Dispense: 30 tablet; Refill: 3    7. Labile blood pressure  Comments:  Related to anxiety  Orders:  -     ALPRAZolam (XANAX) 0.5 MG tablet; Take 1 tablet by mouth At Night As Needed for Anxiety.  Dispense: 30 tablet; Refill: 3  -     Urinalysis With Microscopic If Indicated (No Culture) - Urine, Clean Catch  -     TSH  -     T4, Free  -     Lipid Panel With / Chol / HDL Ratio  -     CBC & Differential  -     Comprehensive Metabolic Panel  -     Hemoglobin A1c  -     Vitamin B12    8. Elevated blood pressure  reading  Comments:  Trial of amlodipine 2.5 mg daily  Orders:  -     Urinalysis With Microscopic If Indicated (No Culture) - Urine, Clean Catch  -     TSH  -     T4, Free  -     Lipid Panel With / Chol / HDL Ratio  -     CBC & Differential  -     Comprehensive Metabolic Panel  -     Hemoglobin A1c  -     Vitamin B12    9. Elevated glucose  -     Hemoglobin A1c    Other orders  -     amLODIPine (NORVASC) 2.5 MG tablet; Take 1 tablet by mouth Daily.  Dispense: 90 tablet; Refill: 2             Follow Up   Return in about 6 months (around 8/8/2023) for Recheck.  Patient was given instructions and counseling regarding her condition or for health maintenance advice. Please see specific information pulled into the AVS if appropriate.

## 2023-02-08 NOTE — PROGRESS NOTES
The ABCs of the Annual Wellness Visit  Subsequent Medicare Wellness Visit    Subjective    Sosa Diamond is a 72 y.o. female who presents for a Subsequent Medicare Wellness Visit.    The following portions of the patient's history were reviewed and   updated as appropriate: allergies, current medications, past family history, past medical history, past social history, past surgical history and problem list.    Compared to one year ago, the patient feels her physical   health is the same.    Compared to one year ago, the patient feels her mental   health is the same.    Recent Hospitalizations:  She was not admitted to the hospital during the last year.       Current Medical Providers:  Patient Care Team:  Zaheer Mccrary MD as PCP - General (Family Medicine)    Outpatient Medications Prior to Visit   Medication Sig Dispense Refill   • ALPRAZolam (XANAX) 0.5 MG tablet TAKE ONE TABLET BY MOUTH ONCE NIGHTLY AS NEEDED FOR ANXIETY OR SLEEP 30 tablet 0   • azelaic acid (AZELEX) 15 % gel      • cetirizine (ZyrTEC) 10 MG tablet Take 10 mg by mouth daily     • omeprazole (priLOSEC) 40 MG capsule Take 1 capsule by mouth Daily. 90 capsule 3   • PREMPRO 0.3-1.5 MG per tablet Take 1 tablet by mouth Daily.       No facility-administered medications prior to visit.       No opioid medication identified on active medication list. I have reviewed chart for other potential  high risk medication/s and harmful drug interactions in the elderly.          Aspirin is not on active medication list.  Aspirin use is not indicated based on review of current medical condition/s. Risk of harm outweighs potential benefits.  .    Patient Active Problem List   Diagnosis   • Unspecified inflammatory spondylopathy, lumbosacral region (HCC)   • Positive colorectal cancer screening using Cologuard test   • Back pain   • Hyperlipidemia   • Facet arthritis of lumbar region   • GERD (gastroesophageal reflux disease)   • Gastroesophageal reflux disease   •  "Fatty liver   • Diverticulosis     Advance Care Planning  Advance Directive is not on file.  ACP discussion was held with the patient during this visit. Patient has an advance directive (not in EMR), copy requested.     Objective    Vitals:    02/08/23 1010   BP: 138/98   BP Location: Left arm   Patient Position: Sitting   Cuff Size: Adult   Pulse: 77   Temp: 98.4 °F (36.9 °C)   TempSrc: Tympanic   SpO2: 97%   Weight: 86.2 kg (190 lb)   PainSc: 0-No pain     Estimated body mass index is 28.89 kg/m² as calculated from the following:    Height as of 6/9/22: 172.7 cm (68\").    Weight as of this encounter: 86.2 kg (190 lb).    BMI is >= 25 and <30. (Overweight) The following options were offered after discussion;: exercise counseling/recommendations      Does the patient have evidence of cognitive impairment? No          HEALTH RISK ASSESSMENT    Smoking Status:  Social History     Tobacco Use   Smoking Status Never   Smokeless Tobacco Never     Alcohol Consumption:  Social History     Substance and Sexual Activity   Alcohol Use Yes   • Alcohol/week: 10.0 standard drinks   • Types: 10 Glasses of wine per week    Comment: daily     Fall Risk Screen:    [facility], and patient is at LOW risk for falls.Assessment completed on:2/8/2023    Depression Screening:  PHQ-2/PHQ-9 Depression Screening 2/8/2023   Little Interest or Pleasure in Doing Things 0-->not at all   Feeling Down, Depressed or Hopeless 0-->not at all   Trouble Falling or Staying Asleep, or Sleeping Too Much 0-->not at all   Feeling Tired or Having Little Energy 0-->not at all   Poor Appetite or Overeating 0-->not at all   Feeling Bad about Yourself - or that You are a Failure or Have Let Yourself or Your Family Down 0-->not at all   Trouble Concentrating on Things, Such as Reading the Newspaper or Watching Television 0-->not at all   Moving or Speaking So Slowly that Other People Could Have Noticed? Or the Opposite - Being So " Fidgety 0-->not at all   Thoughts that You Would be Better Off Dead or of Hurting Yourself in Some Way 0-->not at all   PHQ-9: Brief Depression Severity Measure Score 0   If You Checked Off Any Problems, How Difficult Have These Problems Made It For You to Do Your Work, Take Care of Things at Home, or Get Along with Other People? not difficult at all       Health Habits and Functional and Cognitive Screening:  Functional & Cognitive Status 2/8/2023   Do you have difficulty preparing food and eating? No   Do you have difficulty bathing yourself, getting dressed or grooming yourself? No   Do you have difficulty using the toilet? No   Do you have difficulty moving around from place to place? No   Do you have trouble with steps or getting out of a bed or a chair? No   Current Diet Well Balanced Diet   Dental Exam Up to date   Eye Exam Up to date   Exercise (times per week) 3 times per week   Current Exercises Include Stationary Bicycling/Spin Class;Home Exercise Program (TV, Computer, Etc.)   Current Exercise Activities Include -   Do you need help using the phone?  No   Are you deaf or do you have serious difficulty hearing?  No   Do you need help with transportation? No   Do you need help shopping? No   Do you need help preparing meals?  No   Do you need help with housework?  No   Do you need help with laundry? No   Do you need help taking your medications? No   Do you need help managing money? No   Do you ever drive or ride in a car without wearing a seat belt? No   Have you felt unusual stress, anger or loneliness in the last month? Yes   Who do you live with? Spouse   If you need help, do you have trouble finding someone available to you? No   Have you been bothered in the last four weeks by sexual problems? No   Do you have difficulty concentrating, remembering or making decisions? No       Age-appropriate Screening Schedule:  Refer to the list below for future screening recommendations based on patient's age, sex  and/or medical conditions. Orders for these recommended tests are listed in the plan section. The patient has been provided with a written plan.    Health Maintenance   Topic Date Due   • DXA SCAN  Never done   • PAP SMEAR  Never done   • ZOSTER VACCINE (2 of 3) 01/27/2017   • LIPID PANEL  01/28/2023   • MAMMOGRAM  02/03/2025   • TDAP/TD VACCINES (2 - Td or Tdap) 10/09/2025   • INFLUENZA VACCINE  Completed                CMS Preventative Services Quick Reference  Risk Factors Identified During Encounter  Chronic Pain: Home exercise plan outlined.  The above risks/problems have been discussed with the patient.  Pertinent information has been shared with the patient in the After Visit Summary.  An After Visit Summary and PPPS were made available to the patient.    Follow Up:   Next Medicare Wellness visit to be scheduled in 1 year.       Additional E&M Note during same encounter follows:  Patient has multiple medical problems which are significant and separately identifiable that require additional work above and beyond the Medicare Wellness Visit.      Chief Complaint  Medicare Wellness-subsequent    Subjective        HPI  Sosa Diamond is also being seen today for medicare wellness, facet arthritis lumbar, torn meniscus right knee.         Objective   Vital Signs:  /98 (BP Location: Left arm, Patient Position: Sitting, Cuff Size: Adult)   Pulse 77   Temp 98.4 °F (36.9 °C) (Tympanic)   Wt 86.2 kg (190 lb)   SpO2 97%   BMI 28.89 kg/m²     Physical Exam                    Assessment and Plan   There are no diagnoses linked to this encounter.         Follow Up   No follow-ups on file.  Patient was given instructions and counseling regarding her condition or for health maintenance advice. Please see specific information pulled into the AVS if appropriate.

## 2023-02-09 LAB
ALBUMIN SERPL-MCNC: 4.6 G/DL (ref 3.5–5.2)
ALBUMIN/GLOB SERPL: 2 G/DL
ALP SERPL-CCNC: 67 U/L (ref 39–117)
ALT SERPL-CCNC: 36 U/L (ref 1–33)
APPEARANCE UR: CLEAR
AST SERPL-CCNC: 33 U/L (ref 1–32)
BASOPHILS # BLD AUTO: 0.04 10*3/MM3 (ref 0–0.2)
BASOPHILS NFR BLD AUTO: 0.5 % (ref 0–1.5)
BILIRUB SERPL-MCNC: 0.5 MG/DL (ref 0–1.2)
BILIRUB UR QL STRIP: NEGATIVE
BUN SERPL-MCNC: 12 MG/DL (ref 8–23)
BUN/CREAT SERPL: 16 (ref 7–25)
CALCIUM SERPL-MCNC: 9.7 MG/DL (ref 8.6–10.5)
CHLORIDE SERPL-SCNC: 103 MMOL/L (ref 98–107)
CHOLEST SERPL-MCNC: 243 MG/DL (ref 0–200)
CHOLEST/HDLC SERPL: 4.34 {RATIO}
CO2 SERPL-SCNC: 24.9 MMOL/L (ref 22–29)
COLOR UR: YELLOW
CREAT SERPL-MCNC: 0.75 MG/DL (ref 0.57–1)
EGFRCR SERPLBLD CKD-EPI 2021: 85.8 ML/MIN/1.73
EOSINOPHIL # BLD AUTO: 0.05 10*3/MM3 (ref 0–0.4)
EOSINOPHIL NFR BLD AUTO: 0.6 % (ref 0.3–6.2)
ERYTHROCYTE [DISTWIDTH] IN BLOOD BY AUTOMATED COUNT: 12.4 % (ref 12.3–15.4)
GLOBULIN SER CALC-MCNC: 2.3 GM/DL
GLUCOSE SERPL-MCNC: 101 MG/DL (ref 65–99)
GLUCOSE UR QL STRIP: NEGATIVE
HBA1C MFR BLD: 4.9 % (ref 4.8–5.6)
HCT VFR BLD AUTO: 42.6 % (ref 34–46.6)
HDLC SERPL-MCNC: 56 MG/DL (ref 40–60)
HGB BLD-MCNC: 14.4 G/DL (ref 12–15.9)
HGB UR QL STRIP: NEGATIVE
IMM GRANULOCYTES # BLD AUTO: 0.02 10*3/MM3 (ref 0–0.05)
IMM GRANULOCYTES NFR BLD AUTO: 0.2 % (ref 0–0.5)
KETONES UR QL STRIP: NEGATIVE
LDLC SERPL CALC-MCNC: 161 MG/DL (ref 0–100)
LEUKOCYTE ESTERASE UR QL STRIP: NEGATIVE
LYMPHOCYTES # BLD AUTO: 2.27 10*3/MM3 (ref 0.7–3.1)
LYMPHOCYTES NFR BLD AUTO: 28.2 % (ref 19.6–45.3)
MCH RBC QN AUTO: 31.6 PG (ref 26.6–33)
MCHC RBC AUTO-ENTMCNC: 33.8 G/DL (ref 31.5–35.7)
MCV RBC AUTO: 93.6 FL (ref 79–97)
MONOCYTES # BLD AUTO: 0.54 10*3/MM3 (ref 0.1–0.9)
MONOCYTES NFR BLD AUTO: 6.7 % (ref 5–12)
NEUTROPHILS # BLD AUTO: 5.12 10*3/MM3 (ref 1.7–7)
NEUTROPHILS NFR BLD AUTO: 63.8 % (ref 42.7–76)
NITRITE UR QL STRIP: NEGATIVE
NRBC BLD AUTO-RTO: 0 /100 WBC (ref 0–0.2)
PH UR STRIP: 7 [PH] (ref 5–8)
PLATELET # BLD AUTO: 326 10*3/MM3 (ref 140–450)
POTASSIUM SERPL-SCNC: 4.6 MMOL/L (ref 3.5–5.2)
PROT SERPL-MCNC: 6.9 G/DL (ref 6–8.5)
PROT UR QL STRIP: NEGATIVE
RBC # BLD AUTO: 4.55 10*6/MM3 (ref 3.77–5.28)
SODIUM SERPL-SCNC: 138 MMOL/L (ref 136–145)
SP GR UR STRIP: 1.01 (ref 1–1.03)
T4 FREE SERPL-MCNC: 0.91 NG/DL (ref 0.93–1.7)
TRIGL SERPL-MCNC: 147 MG/DL (ref 0–150)
TSH SERPL DL<=0.005 MIU/L-ACNC: 1.96 UIU/ML (ref 0.27–4.2)
UROBILINOGEN UR STRIP-MCNC: NORMAL MG/DL
VIT B12 SERPL-MCNC: 493 PG/ML (ref 211–946)
VLDLC SERPL CALC-MCNC: 26 MG/DL (ref 5–40)
WBC # BLD AUTO: 8.04 10*3/MM3 (ref 3.4–10.8)

## 2023-02-15 RX ORDER — ROSUVASTATIN CALCIUM 5 MG/1
5 TABLET, COATED ORAL DAILY
Qty: 90 TABLET | Refills: 1 | Status: SHIPPED | OUTPATIENT
Start: 2023-02-15

## 2023-06-01 DIAGNOSIS — K21.9 GASTROESOPHAGEAL REFLUX DISEASE: ICD-10-CM

## 2023-06-01 RX ORDER — OMEPRAZOLE 40 MG/1
CAPSULE, DELAYED RELEASE ORAL
Qty: 90 CAPSULE | Refills: 0 | Status: SHIPPED | OUTPATIENT
Start: 2023-06-01

## 2023-06-13 ENCOUNTER — OFFICE VISIT (OUTPATIENT)
Dept: GASTROENTEROLOGY | Facility: CLINIC | Age: 73
End: 2023-06-13
Payer: MEDICARE

## 2023-06-13 VITALS
SYSTOLIC BLOOD PRESSURE: 114 MMHG | OXYGEN SATURATION: 98 % | TEMPERATURE: 97.1 F | HEIGHT: 68 IN | WEIGHT: 189.4 LBS | DIASTOLIC BLOOD PRESSURE: 79 MMHG | BODY MASS INDEX: 28.7 KG/M2 | HEART RATE: 77 BPM

## 2023-06-13 DIAGNOSIS — K21.9 GASTROESOPHAGEAL REFLUX DISEASE: ICD-10-CM

## 2023-06-13 DIAGNOSIS — K57.90 DIVERTICULOSIS: ICD-10-CM

## 2023-06-13 DIAGNOSIS — Z87.19 HISTORY OF GASTRITIS: ICD-10-CM

## 2023-06-13 DIAGNOSIS — K21.9 GASTROESOPHAGEAL REFLUX DISEASE WITHOUT ESOPHAGITIS: Primary | ICD-10-CM

## 2023-06-13 DIAGNOSIS — Z86.010 PERSONAL HISTORY OF COLONIC POLYPS: ICD-10-CM

## 2023-06-13 RX ORDER — OMEPRAZOLE 40 MG/1
40 CAPSULE, DELAYED RELEASE ORAL DAILY
Qty: 90 CAPSULE | Refills: 3 | Status: SHIPPED | OUTPATIENT
Start: 2023-06-13

## 2023-06-13 RX ORDER — ACYCLOVIR 400 MG/1
TABLET ORAL
COMMUNITY
Start: 2023-04-28

## 2023-06-13 NOTE — PROGRESS NOTES
"Chief Complaint  Med Refill    Subjective          History of Present Illness    Sosa Diamond is a  73 y.o. female presents for follow-up on GERD.  She is a patient Dr. Greenwood last seen by Trisha CANALES on 6/9/2022.  She is new to me.    She has a history of GERD and gastritis and takes omeprazole 40 mg daily.  She denies any breakthrough reflux at this time.  Works well for her.  She denies abdominal pain, nausea, vomiting, dysphagia, melena, hematochezia, diarrhea, constipation.  Weight is stable.    She is a history of diverticulitis status post sigmoid colon resection    4/16/21 EGD showed gastric polyp and gastritis.  Path unremarkable.  Colonoscopy at the same time showed diverticulosis, nonbleeding internal hemorrhoids, 1 adenomatous colon polyp.  Recall 5 years.      Objective   Vital Signs:   /79   Pulse 77   Temp 97.1 °F (36.2 °C)   Ht 172.7 cm (68\")   Wt 85.9 kg (189 lb 6.4 oz)   SpO2 98%   BMI 28.80 kg/m²       Physical Exam  Vitals reviewed.   Constitutional:       General: She is awake. She is not in acute distress.     Appearance: Normal appearance. She is well-developed and well-groomed.   HENT:      Head: Normocephalic.   Pulmonary:      Effort: Pulmonary effort is normal. No respiratory distress.   Abdominal:      Comments: Rectal exam:declined   Skin:     Coloration: Skin is not pale.   Neurological:      Mental Status: She is alert and oriented to person, place, and time.      Gait: Gait is intact.   Psychiatric:         Mood and Affect: Mood and affect normal.         Speech: Speech normal.         Behavior: Behavior is cooperative.         Judgment: Judgment normal.        Result Review :             Assessment and Plan    Diagnoses and all orders for this visit:    1. Gastroesophageal reflux disease without esophagitis (Primary)    2. History of gastritis    3. Diverticulosis    4. Personal history of colonic polyps    5. Gastroesophageal reflux disease  -     omeprazole " (priLOSEC) 40 MG capsule; Take 1 capsule by mouth Daily.  Dispense: 90 capsule; Refill: 3    Refilled omeprazole 40 mg daily. Discussed trying to decrease to 20mg daily--she will try OTC for 2 weeks and let me know if this works for her.  We will plan for decreased dosing prescription if she does well.  If not continue omeprazole 40 mg daily.    Due repeat colonoscopy 2026.    Follow Up   Return in about 1 year (around 6/13/2024).    Dragon dictation used throughout this note.     Alyssa Avilez PA-C

## 2023-08-10 ENCOUNTER — OFFICE VISIT (OUTPATIENT)
Dept: INTERNAL MEDICINE | Facility: CLINIC | Age: 73
End: 2023-08-10
Payer: MEDICARE

## 2023-08-10 VITALS
DIASTOLIC BLOOD PRESSURE: 84 MMHG | OXYGEN SATURATION: 98 % | BODY MASS INDEX: 28.74 KG/M2 | SYSTOLIC BLOOD PRESSURE: 134 MMHG | WEIGHT: 189 LBS | HEART RATE: 77 BPM

## 2023-08-10 DIAGNOSIS — R74.8 ELEVATED LIVER ENZYMES: ICD-10-CM

## 2023-08-10 DIAGNOSIS — E78.2 MIXED HYPERLIPIDEMIA: Primary | ICD-10-CM

## 2023-08-10 DIAGNOSIS — Z11.59 ENCOUNTER FOR SCREENING FOR OTHER VIRAL DISEASES: ICD-10-CM

## 2023-08-10 DIAGNOSIS — I10 PRIMARY HYPERTENSION: ICD-10-CM

## 2023-08-10 PROCEDURE — 99214 OFFICE O/P EST MOD 30 MIN: CPT | Performed by: FAMILY MEDICINE

## 2023-08-10 PROCEDURE — 1159F MED LIST DOCD IN RCRD: CPT | Performed by: FAMILY MEDICINE

## 2023-08-10 PROCEDURE — 1160F RVW MEDS BY RX/DR IN RCRD: CPT | Performed by: FAMILY MEDICINE

## 2023-08-10 NOTE — PROGRESS NOTES
"Chief Complaint  Hyperlipidemia and Heartburn    Subjective        Sosa Diamond presents to [facility]  History of Present Illness  Jadon is a delightful lady whose been dealing with effects of degenerative joint disease and lumbar spine.    Prior labs were addressed with the addition of rosuvastatin/Crestor 5 mg daily for hyperlipidemia plus noting AST and ALT being elevated we will repeat labs including liver function enzymes plus check hepatitis antibodies to use A, B, and C.  Consideration would be mild fatty liver.    Continue treatment with blood pressure with amlodipine 2.5 mg daily.  Hyperlipidemia    Heartburn      Objective   Vital Signs:  /84 (BP Location: Right arm, Patient Position: Sitting, Cuff Size: Adult)   Pulse 77   Wt 85.7 kg (189 lb)   SpO2 98%   BMI 28.74 kg/mý   Estimated body mass index is 28.74 kg/mý as calculated from the following:    Height as of 6/13/23: 172.7 cm (68\").    Weight as of this encounter: 85.7 kg (189 lb).               Physical Exam  Vitals reviewed.   Constitutional:       Appearance: She is well-developed.   HENT:      Head: Normocephalic and atraumatic.      Right Ear: Tympanic membrane and external ear normal.      Left Ear: Tympanic membrane and external ear normal.      Nose: Nose normal.   Eyes:      Conjunctiva/sclera: Conjunctivae normal.      Pupils: Pupils are equal, round, and reactive to light.   Neck:      Thyroid: No thyromegaly.      Vascular: No JVD.   Cardiovascular:      Rate and Rhythm: Normal rate and regular rhythm.      Heart sounds: Normal heart sounds.   Pulmonary:      Effort: Pulmonary effort is normal.      Breath sounds: Normal breath sounds.   Abdominal:      General: Bowel sounds are normal.      Palpations: Abdomen is soft.   Musculoskeletal:         General: Normal range of motion.      Cervical back: Normal range of motion and neck supple.      Lumbar back: Tenderness present. Decreased range of " motion.   Lymphadenopathy:      Cervical: No cervical adenopathy.   Skin:     General: Skin is warm and dry.      Findings: No rash.   Neurological:      Mental Status: She is alert and oriented to person, place, and time.      Cranial Nerves: No cranial nerve deficit.      Coordination: Coordination normal.   Psychiatric:         Behavior: Behavior normal.         Thought Content: Thought content normal.         Judgment: Judgment normal.      Result Review :  The following data was reviewed by: Zaheer Mccrary MD on 08/10/2023:  Common labs          2/8/2023    11:15   Common Labs   Glucose 101    BUN 12    Creatinine 0.75    Sodium 138    Potassium 4.6    Chloride 103    Calcium 9.7    Total Protein 6.9    Albumin 4.6    Total Bilirubin 0.5    Alkaline Phosphatase 67    AST (SGOT) 33    ALT (SGPT) 36    WBC 8.04    Hemoglobin 14.4    Hematocrit 42.6    Platelets 326    Total Cholesterol 243    Triglycerides 147    HDL Cholesterol 56    LDL Cholesterol  161    Hemoglobin A1C 4.90                   Assessment and Plan   Diagnoses and all orders for this visit:    1. Mixed hyperlipidemia (Primary)  -     Hepatitis C Antibody  -     Hepatitis B Surface Antibody  -     Hepatitis A Antibody, Total  -     Urinalysis With Microscopic If Indicated (No Culture) - Urine, Clean Catch  -     TSH  -     Lipid Panel With / Chol / HDL Ratio  -     CBC & Differential  -     Comprehensive Metabolic Panel    2. Primary hypertension  -     Hepatitis C Antibody  -     Hepatitis B Surface Antibody  -     Hepatitis A Antibody, Total  -     Urinalysis With Microscopic If Indicated (No Culture) - Urine, Clean Catch  -     TSH  -     Lipid Panel With / Chol / HDL Ratio  -     CBC & Differential  -     Comprehensive Metabolic Panel    3. Elevated liver enzymes  -     Hepatitis C Antibody  -     Hepatitis B Surface Antibody  -     Hepatitis A Antibody, Total  -     Urinalysis With Microscopic If Indicated (No Culture) - Urine, Clean Catch  -      TSH  -     Lipid Panel With / Chol / HDL Ratio  -     CBC & Differential  -     Comprehensive Metabolic Panel    4. Encounter for screening for other viral diseases  -     Hepatitis B Surface Antibody             Follow Up   No follow-ups on file.  Patient was given instructions and counseling regarding her condition or for health maintenance advice. Please see specific information pulled into the AVS if appropriate.

## 2023-08-11 LAB
ALBUMIN SERPL-MCNC: 4.6 G/DL (ref 3.5–5.2)
ALBUMIN/GLOB SERPL: 1.9 G/DL
ALP SERPL-CCNC: 67 U/L (ref 39–117)
ALT SERPL-CCNC: 25 U/L (ref 1–33)
APPEARANCE UR: CLEAR
AST SERPL-CCNC: 25 U/L (ref 1–32)
BASOPHILS # BLD AUTO: 0.05 10*3/MM3 (ref 0–0.2)
BASOPHILS NFR BLD AUTO: 0.7 % (ref 0–1.5)
BILIRUB SERPL-MCNC: 0.5 MG/DL (ref 0–1.2)
BILIRUB UR QL STRIP: NEGATIVE
BUN SERPL-MCNC: 13 MG/DL (ref 8–23)
BUN/CREAT SERPL: 15.5 (ref 7–25)
CALCIUM SERPL-MCNC: 10.3 MG/DL (ref 8.6–10.5)
CHLORIDE SERPL-SCNC: 101 MMOL/L (ref 98–107)
CHOLEST SERPL-MCNC: 159 MG/DL (ref 0–200)
CHOLEST/HDLC SERPL: 2.69 {RATIO}
CO2 SERPL-SCNC: 24.6 MMOL/L (ref 22–29)
COLOR UR: YELLOW
CREAT SERPL-MCNC: 0.84 MG/DL (ref 0.57–1)
EGFRCR SERPLBLD CKD-EPI 2021: 74.4 ML/MIN/1.73
EOSINOPHIL # BLD AUTO: 0.07 10*3/MM3 (ref 0–0.4)
EOSINOPHIL NFR BLD AUTO: 1 % (ref 0.3–6.2)
ERYTHROCYTE [DISTWIDTH] IN BLOOD BY AUTOMATED COUNT: 12.9 % (ref 12.3–15.4)
GLOBULIN SER CALC-MCNC: 2.4 GM/DL
GLUCOSE SERPL-MCNC: 105 MG/DL (ref 65–99)
GLUCOSE UR QL STRIP: NEGATIVE
HAV AB SER QL IA: NEGATIVE
HBV SURFACE AB SER QL: NON REACTIVE
HCT VFR BLD AUTO: 40.7 % (ref 34–46.6)
HCV IGG SERPL QL IA: NON REACTIVE
HDLC SERPL-MCNC: 59 MG/DL (ref 40–60)
HGB BLD-MCNC: 14.1 G/DL (ref 12–15.9)
HGB UR QL STRIP: NEGATIVE
IMM GRANULOCYTES # BLD AUTO: 0.01 10*3/MM3 (ref 0–0.05)
IMM GRANULOCYTES NFR BLD AUTO: 0.1 % (ref 0–0.5)
KETONES UR QL STRIP: NEGATIVE
LDLC SERPL CALC-MCNC: 82 MG/DL (ref 0–100)
LEUKOCYTE ESTERASE UR QL STRIP: NEGATIVE
LYMPHOCYTES # BLD AUTO: 2.18 10*3/MM3 (ref 0.7–3.1)
LYMPHOCYTES NFR BLD AUTO: 30.5 % (ref 19.6–45.3)
MCH RBC QN AUTO: 32.6 PG (ref 26.6–33)
MCHC RBC AUTO-ENTMCNC: 34.6 G/DL (ref 31.5–35.7)
MCV RBC AUTO: 94.2 FL (ref 79–97)
MONOCYTES # BLD AUTO: 0.5 10*3/MM3 (ref 0.1–0.9)
MONOCYTES NFR BLD AUTO: 7 % (ref 5–12)
NEUTROPHILS # BLD AUTO: 4.33 10*3/MM3 (ref 1.7–7)
NEUTROPHILS NFR BLD AUTO: 60.7 % (ref 42.7–76)
NITRITE UR QL STRIP: NEGATIVE
NRBC BLD AUTO-RTO: 0 /100 WBC (ref 0–0.2)
PH UR STRIP: 6.5 [PH] (ref 5–8)
PLATELET # BLD AUTO: 307 10*3/MM3 (ref 140–450)
POTASSIUM SERPL-SCNC: 4.7 MMOL/L (ref 3.5–5.2)
PROT SERPL-MCNC: 7 G/DL (ref 6–8.5)
PROT UR QL STRIP: NEGATIVE
RBC # BLD AUTO: 4.32 10*6/MM3 (ref 3.77–5.28)
SODIUM SERPL-SCNC: 139 MMOL/L (ref 136–145)
SP GR UR STRIP: 1.01 (ref 1–1.03)
TRIGL SERPL-MCNC: 97 MG/DL (ref 0–150)
TSH SERPL DL<=0.005 MIU/L-ACNC: 1.79 UIU/ML (ref 0.27–4.2)
UROBILINOGEN UR STRIP-MCNC: NORMAL MG/DL
VLDLC SERPL CALC-MCNC: 18 MG/DL (ref 5–40)
WBC # BLD AUTO: 7.14 10*3/MM3 (ref 3.4–10.8)

## 2023-08-11 RX ORDER — ROSUVASTATIN CALCIUM 5 MG/1
TABLET, COATED ORAL
Qty: 90 TABLET | Refills: 1 | Status: SHIPPED | OUTPATIENT
Start: 2023-08-11

## 2023-11-01 RX ORDER — AMLODIPINE BESYLATE 2.5 MG/1
2.5 TABLET ORAL DAILY
Qty: 90 TABLET | Refills: 2 | Status: SHIPPED | OUTPATIENT
Start: 2023-11-01

## 2023-11-14 ENCOUNTER — PATIENT MESSAGE (OUTPATIENT)
Dept: GASTROENTEROLOGY | Facility: CLINIC | Age: 73
End: 2023-11-14
Payer: MEDICARE

## 2023-11-15 RX ORDER — AMOXICILLIN AND CLAVULANATE POTASSIUM 875; 125 MG/1; MG/1
1 TABLET, FILM COATED ORAL 2 TIMES DAILY
Qty: 20 TABLET | Refills: 0 | Status: SHIPPED | OUTPATIENT
Start: 2023-11-15 | End: 2023-11-25

## 2023-11-15 NOTE — TELEPHONE ENCOUNTER
From: Sosa Diamond  To: Alyssa Avilez  Sent: 11/14/2023 4:12 PM EST  Subject: Diverticulitis     I have had diverticulitis many times in my life. Mine presents as pain in my left side. It started Thursday and I thought it would go away but is still lingering. I was wondering if you/Dr Greenwood could prescribe amoxicillin or mild antibiotic to treat it?   Thanks,  Sosa Diamond

## 2024-02-05 ENCOUNTER — TELEPHONE (OUTPATIENT)
Dept: GASTROENTEROLOGY | Facility: CLINIC | Age: 74
End: 2024-02-05
Payer: MEDICARE

## 2024-02-05 DIAGNOSIS — E78.2 MIXED HYPERLIPIDEMIA: Primary | ICD-10-CM

## 2024-02-05 DIAGNOSIS — R09.89 LABILE BLOOD PRESSURE: ICD-10-CM

## 2024-02-05 DIAGNOSIS — F41.9 ANXIETY: ICD-10-CM

## 2024-02-05 NOTE — TELEPHONE ENCOUNTER
I tried to call patient and had to leave voice message.  Attempted to work in tomorrow at 815.  If she can come in, I asked her to reply back to the message and she will let the office and we can stick her in.

## 2024-02-05 NOTE — TELEPHONE ENCOUNTER
----- Message from Rosanne Cabral RN sent at 2/5/2024  8:12 AM EST -----  Regarding: FW: Diverticulitis   Contact: [phone]    ----- Message -----  From: Sosa Diamond  Sent: 2/4/2024  12:26 PM EST  To: [facility] Clinical Pool  Subject: Diverticulitis                                   I have had diarrhea since last Tuesday, bloated stomach, tired, no appetite. I thought it may be stomach bug. I still have same symptoms and now pain in left side( not sure why it took days before pain)  Thinking intestinal infection/diverticulitis , not sure what to do. Do you think we should try anti biotic to clear up infection? Please let me know your thoughts or Dr Greenwood’s thoughts. Thank you.  Lilia  (I have been under a lot of stress that tends to aggravate my diverticulitis )

## 2024-02-06 NOTE — TELEPHONE ENCOUNTER
Contract on file    Rx Refill Note  Requested Prescriptions     Pending Prescriptions Disp Refills    ALPRAZolam (XANAX) 0.5 MG tablet [Pharmacy Med Name: ALPRAZOLAM 0.5 MG TABLET] 30 tablet 5     Sig: TAKE ONE TABLET BY MOUTH ONCE NIGHTLY AS NEEDED FOR ANXIETY    rosuvastatin (CRESTOR) 5 MG tablet [Pharmacy Med Name: ROSUVASTATIN CALCIUM 5 MG TAB] 90 tablet 1     Sig: TAKE 1 TABLET BY MOUTH DAILY      Last office visit with prescribing clinician: 8/10/2023   Last telemedicine visit with prescribing clinician: Visit date not found   Next office visit with prescribing clinician: 2/13/2024       Maye Storey MA  02/06/24, 09:36 EST

## 2024-02-07 RX ORDER — ALPRAZOLAM 0.5 MG/1
0.5 TABLET ORAL NIGHTLY PRN
Qty: 30 TABLET | Refills: 5 | Status: SHIPPED | OUTPATIENT
Start: 2024-02-07

## 2024-02-07 RX ORDER — ROSUVASTATIN CALCIUM 5 MG/1
5 TABLET, COATED ORAL DAILY
Qty: 90 TABLET | Refills: 1 | Status: SHIPPED | OUTPATIENT
Start: 2024-02-07

## 2024-02-13 ENCOUNTER — OFFICE VISIT (OUTPATIENT)
Dept: INTERNAL MEDICINE | Facility: CLINIC | Age: 74
End: 2024-02-13
Payer: MEDICARE

## 2024-02-13 VITALS
OXYGEN SATURATION: 95 % | SYSTOLIC BLOOD PRESSURE: 108 MMHG | DIASTOLIC BLOOD PRESSURE: 74 MMHG | WEIGHT: 187 LBS | HEART RATE: 74 BPM | BODY MASS INDEX: 28.43 KG/M2

## 2024-02-13 DIAGNOSIS — K21.00 GASTROESOPHAGEAL REFLUX DISEASE WITH ESOPHAGITIS WITHOUT HEMORRHAGE: ICD-10-CM

## 2024-02-13 DIAGNOSIS — H93.11 TINNITUS AURIUM, RIGHT: Primary | ICD-10-CM

## 2024-02-13 DIAGNOSIS — E53.8 B12 DEFICIENCY: ICD-10-CM

## 2024-02-13 DIAGNOSIS — M43.16 SPONDYLOLISTHESIS OF LUMBAR REGION: ICD-10-CM

## 2024-02-13 DIAGNOSIS — F41.9 ANXIETY: ICD-10-CM

## 2024-02-13 DIAGNOSIS — E78.2 MIXED HYPERLIPIDEMIA: ICD-10-CM

## 2024-02-13 DIAGNOSIS — M54.40 CHRONIC RIGHT-SIDED LOW BACK PAIN WITH SCIATICA, SCIATICA LATERALITY UNSPECIFIED: ICD-10-CM

## 2024-02-13 DIAGNOSIS — G89.29 CHRONIC RIGHT-SIDED LOW BACK PAIN WITH SCIATICA, SCIATICA LATERALITY UNSPECIFIED: ICD-10-CM

## 2024-02-13 DIAGNOSIS — M47.816 FACET ARTHRITIS OF LUMBAR REGION: ICD-10-CM

## 2024-02-13 DIAGNOSIS — I10 PRIMARY HYPERTENSION: ICD-10-CM

## 2024-02-13 DIAGNOSIS — R73.09 ELEVATED GLUCOSE: ICD-10-CM

## 2024-02-13 PROCEDURE — 1159F MED LIST DOCD IN RCRD: CPT | Performed by: FAMILY MEDICINE

## 2024-02-13 PROCEDURE — 1160F RVW MEDS BY RX/DR IN RCRD: CPT | Performed by: FAMILY MEDICINE

## 2024-02-13 PROCEDURE — 99214 OFFICE O/P EST MOD 30 MIN: CPT | Performed by: FAMILY MEDICINE

## 2024-02-14 LAB
ALBUMIN SERPL-MCNC: 5.1 G/DL (ref 3.5–5.2)
ALBUMIN/GLOB SERPL: 2.1 G/DL
ALP SERPL-CCNC: 73 U/L (ref 39–117)
ALT SERPL-CCNC: 31 U/L (ref 1–33)
APPEARANCE UR: CLEAR
AST SERPL-CCNC: 28 U/L (ref 1–32)
BASOPHILS # BLD AUTO: 0.06 10*3/MM3 (ref 0–0.2)
BASOPHILS NFR BLD AUTO: 0.7 % (ref 0–1.5)
BILIRUB SERPL-MCNC: 0.5 MG/DL (ref 0–1.2)
BILIRUB UR QL STRIP: NEGATIVE
BUN SERPL-MCNC: 12 MG/DL (ref 8–23)
BUN/CREAT SERPL: 14 (ref 7–25)
CALCIUM SERPL-MCNC: 10.8 MG/DL (ref 8.6–10.5)
CHLORIDE SERPL-SCNC: 102 MMOL/L (ref 98–107)
CHOLEST SERPL-MCNC: 160 MG/DL (ref 0–200)
CHOLEST/HDLC SERPL: 2.91 {RATIO}
CO2 SERPL-SCNC: 24.2 MMOL/L (ref 22–29)
COLOR UR: YELLOW
CREAT SERPL-MCNC: 0.86 MG/DL (ref 0.57–1)
EGFRCR SERPLBLD CKD-EPI 2021: 72.3 ML/MIN/1.73
EOSINOPHIL # BLD AUTO: 0.07 10*3/MM3 (ref 0–0.4)
EOSINOPHIL NFR BLD AUTO: 0.8 % (ref 0.3–6.2)
ERYTHROCYTE [DISTWIDTH] IN BLOOD BY AUTOMATED COUNT: 12.6 % (ref 12.3–15.4)
GLOBULIN SER CALC-MCNC: 2.4 GM/DL
GLUCOSE SERPL-MCNC: 99 MG/DL (ref 65–99)
GLUCOSE UR QL STRIP: NEGATIVE
HBA1C MFR BLD: 4.9 % (ref 4.8–5.6)
HCT VFR BLD AUTO: 45.7 % (ref 34–46.6)
HDLC SERPL-MCNC: 55 MG/DL (ref 40–60)
HGB BLD-MCNC: 15.7 G/DL (ref 12–15.9)
HGB UR QL STRIP: NEGATIVE
IMM GRANULOCYTES # BLD AUTO: 0.02 10*3/MM3 (ref 0–0.05)
IMM GRANULOCYTES NFR BLD AUTO: 0.2 % (ref 0–0.5)
KETONES UR QL STRIP: NEGATIVE
LDLC SERPL CALC-MCNC: 80 MG/DL (ref 0–100)
LEUKOCYTE ESTERASE UR QL STRIP: NEGATIVE
LYMPHOCYTES # BLD AUTO: 3.05 10*3/MM3 (ref 0.7–3.1)
LYMPHOCYTES NFR BLD AUTO: 36.3 % (ref 19.6–45.3)
MCH RBC QN AUTO: 32.2 PG (ref 26.6–33)
MCHC RBC AUTO-ENTMCNC: 34.4 G/DL (ref 31.5–35.7)
MCV RBC AUTO: 93.6 FL (ref 79–97)
MONOCYTES # BLD AUTO: 0.57 10*3/MM3 (ref 0.1–0.9)
MONOCYTES NFR BLD AUTO: 6.8 % (ref 5–12)
NEUTROPHILS # BLD AUTO: 4.64 10*3/MM3 (ref 1.7–7)
NEUTROPHILS NFR BLD AUTO: 55.2 % (ref 42.7–76)
NITRITE UR QL STRIP: NEGATIVE
NRBC BLD AUTO-RTO: 0 /100 WBC (ref 0–0.2)
PH UR STRIP: 6.5 [PH] (ref 5–8)
PLATELET # BLD AUTO: 382 10*3/MM3 (ref 140–450)
POTASSIUM SERPL-SCNC: 4.7 MMOL/L (ref 3.5–5.2)
PROT SERPL-MCNC: 7.5 G/DL (ref 6–8.5)
PROT UR QL STRIP: NEGATIVE
RBC # BLD AUTO: 4.88 10*6/MM3 (ref 3.77–5.28)
SODIUM SERPL-SCNC: 139 MMOL/L (ref 136–145)
SP GR UR STRIP: 1.01 (ref 1–1.03)
T3FREE SERPL-MCNC: 2.9 PG/ML (ref 2–4.4)
T4 FREE SERPL-MCNC: 0.92 NG/DL (ref 0.93–1.7)
TRIGL SERPL-MCNC: 146 MG/DL (ref 0–150)
TSH SERPL DL<=0.005 MIU/L-ACNC: 1.77 UIU/ML (ref 0.27–4.2)
UROBILINOGEN UR STRIP-MCNC: NORMAL MG/DL
VIT B12 SERPL-MCNC: 722 PG/ML (ref 211–946)
VLDLC SERPL CALC-MCNC: 25 MG/DL (ref 5–40)
WBC # BLD AUTO: 8.41 10*3/MM3 (ref 3.4–10.8)

## 2024-07-24 DIAGNOSIS — I10 PRIMARY HYPERTENSION: Primary | ICD-10-CM

## 2024-07-24 DIAGNOSIS — E78.2 MIXED HYPERLIPIDEMIA: ICD-10-CM

## 2024-07-24 RX ORDER — AMLODIPINE BESYLATE 2.5 MG/1
2.5 TABLET ORAL DAILY
Qty: 90 TABLET | Refills: 1 | Status: SHIPPED | OUTPATIENT
Start: 2024-07-24

## 2024-07-24 RX ORDER — ROSUVASTATIN CALCIUM 5 MG/1
5 TABLET, COATED ORAL DAILY
Qty: 90 TABLET | Refills: 1 | Status: SHIPPED | OUTPATIENT
Start: 2024-07-24

## 2024-08-13 ENCOUNTER — OFFICE VISIT (OUTPATIENT)
Dept: INTERNAL MEDICINE | Facility: CLINIC | Age: 74
End: 2024-08-13
Payer: MEDICARE

## 2024-08-13 VITALS
BODY MASS INDEX: 28.28 KG/M2 | HEART RATE: 64 BPM | SYSTOLIC BLOOD PRESSURE: 124 MMHG | OXYGEN SATURATION: 97 % | WEIGHT: 186 LBS | DIASTOLIC BLOOD PRESSURE: 82 MMHG

## 2024-08-13 DIAGNOSIS — Z00.00 MEDICARE ANNUAL WELLNESS VISIT, SUBSEQUENT: ICD-10-CM

## 2024-08-13 DIAGNOSIS — R73.09 ELEVATED GLUCOSE: ICD-10-CM

## 2024-08-13 DIAGNOSIS — E78.2 MIXED HYPERLIPIDEMIA: Primary | ICD-10-CM

## 2024-08-13 DIAGNOSIS — K76.0 FATTY LIVER: ICD-10-CM

## 2024-08-13 DIAGNOSIS — M43.16 SPONDYLOLISTHESIS OF LUMBAR REGION: ICD-10-CM

## 2024-08-13 DIAGNOSIS — K21.9 GASTROESOPHAGEAL REFLUX DISEASE WITHOUT ESOPHAGITIS: ICD-10-CM

## 2024-08-13 DIAGNOSIS — I10 PRIMARY HYPERTENSION: ICD-10-CM

## 2024-08-13 DIAGNOSIS — E53.8 B12 DEFICIENCY: ICD-10-CM

## 2024-08-13 LAB
ALBUMIN SERPL-MCNC: 4.8 G/DL (ref 3.5–5.2)
ALBUMIN/GLOB SERPL: 2 G/DL
ALP SERPL-CCNC: 74 U/L (ref 39–117)
ALT SERPL-CCNC: 29 U/L (ref 1–33)
APPEARANCE UR: CLEAR
AST SERPL-CCNC: 30 U/L (ref 1–32)
BASOPHILS # BLD AUTO: 0.06 10*3/MM3 (ref 0–0.2)
BASOPHILS NFR BLD AUTO: 0.8 % (ref 0–1.5)
BILIRUB SERPL-MCNC: 0.6 MG/DL (ref 0–1.2)
BILIRUB UR QL STRIP: NEGATIVE
BUN SERPL-MCNC: 11 MG/DL (ref 8–23)
BUN/CREAT SERPL: 15.5 (ref 7–25)
CALCIUM SERPL-MCNC: 10.2 MG/DL (ref 8.6–10.5)
CHLORIDE SERPL-SCNC: 102 MMOL/L (ref 98–107)
CHOLEST SERPL-MCNC: 161 MG/DL (ref 0–200)
CHOLEST/HDLC SERPL: 2.78 {RATIO}
CO2 SERPL-SCNC: 22.8 MMOL/L (ref 22–29)
COLOR UR: YELLOW
CREAT SERPL-MCNC: 0.71 MG/DL (ref 0.57–1)
EGFRCR SERPLBLD CKD-EPI 2021: 90.5 ML/MIN/1.73
EOSINOPHIL # BLD AUTO: 0.08 10*3/MM3 (ref 0–0.4)
EOSINOPHIL NFR BLD AUTO: 1.1 % (ref 0.3–6.2)
ERYTHROCYTE [DISTWIDTH] IN BLOOD BY AUTOMATED COUNT: 13 % (ref 12.3–15.4)
GLOBULIN SER CALC-MCNC: 2.4 GM/DL
GLUCOSE SERPL-MCNC: 95 MG/DL (ref 65–99)
GLUCOSE UR QL STRIP: NEGATIVE
HBA1C MFR BLD: 4.7 % (ref 4.8–5.6)
HCT VFR BLD AUTO: 43.8 % (ref 34–46.6)
HDLC SERPL-MCNC: 58 MG/DL (ref 40–60)
HGB BLD-MCNC: 14.9 G/DL (ref 12–15.9)
HGB UR QL STRIP: NEGATIVE
IMM GRANULOCYTES # BLD AUTO: 0.02 10*3/MM3 (ref 0–0.05)
IMM GRANULOCYTES NFR BLD AUTO: 0.3 % (ref 0–0.5)
KETONES UR QL STRIP: NEGATIVE
LDLC SERPL CALC-MCNC: 82 MG/DL (ref 0–100)
LEUKOCYTE ESTERASE UR QL STRIP: NEGATIVE
LYMPHOCYTES # BLD AUTO: 2.31 10*3/MM3 (ref 0.7–3.1)
LYMPHOCYTES NFR BLD AUTO: 32.2 % (ref 19.6–45.3)
MCH RBC QN AUTO: 32.5 PG (ref 26.6–33)
MCHC RBC AUTO-ENTMCNC: 34 G/DL (ref 31.5–35.7)
MCV RBC AUTO: 95.6 FL (ref 79–97)
MONOCYTES # BLD AUTO: 0.58 10*3/MM3 (ref 0.1–0.9)
MONOCYTES NFR BLD AUTO: 8.1 % (ref 5–12)
NEUTROPHILS # BLD AUTO: 4.13 10*3/MM3 (ref 1.7–7)
NEUTROPHILS NFR BLD AUTO: 57.5 % (ref 42.7–76)
NITRITE UR QL STRIP: NEGATIVE
NRBC BLD AUTO-RTO: 0 /100 WBC (ref 0–0.2)
PH UR STRIP: 6.5 [PH] (ref 5–8)
PLATELET # BLD AUTO: 295 10*3/MM3 (ref 140–450)
POTASSIUM SERPL-SCNC: 4.8 MMOL/L (ref 3.5–5.2)
PROT SERPL-MCNC: 7.2 G/DL (ref 6–8.5)
PROT UR QL STRIP: NEGATIVE
RBC # BLD AUTO: 4.58 10*6/MM3 (ref 3.77–5.28)
SODIUM SERPL-SCNC: 139 MMOL/L (ref 136–145)
SP GR UR STRIP: NORMAL (ref 1–1.03)
T4 FREE SERPL-MCNC: 0.97 NG/DL (ref 0.92–1.68)
TRIGL SERPL-MCNC: 120 MG/DL (ref 0–150)
TSH SERPL DL<=0.005 MIU/L-ACNC: 1.83 UIU/ML (ref 0.27–4.2)
UROBILINOGEN UR STRIP-MCNC: NORMAL MG/DL
VIT B12 SERPL-MCNC: 494 PG/ML (ref 211–946)
VLDLC SERPL CALC-MCNC: 21 MG/DL (ref 5–40)
WBC # BLD AUTO: 7.18 10*3/MM3 (ref 3.4–10.8)

## 2024-08-13 NOTE — PROGRESS NOTES
Subjective   The ABCs of the Annual Wellness Visit  Medicare Wellness Visit      Sosa Diamond is a 74 y.o. patient who presents for a Medicare Wellness Visit.    The following portions of the patient's history were reviewed and   updated as appropriate: allergies, current medications, past family history, past medical history, past social history, past surgical history, and problem list.    Compared to one year ago, the patient's physical   health is the same.  Compared to one year ago, the patient's mental   health is the same.    Recent Hospitalizations:  She was not admitted to the hospital during the last year.     Current Medical Providers:  Patient Care Team:  Zaheer Mccrary MD as PCP - General (Family Medicine)    Outpatient Medications Prior to Visit   Medication Sig Dispense Refill    ALPRAZolam (XANAX) 0.5 MG tablet TAKE ONE TABLET BY MOUTH ONCE NIGHTLY AS NEEDED FOR ANXIETY 30 tablet 5    amLODIPine (NORVASC) 2.5 MG tablet TAKE 1 TABLET BY MOUTH DAILY 90 tablet 1    cetirizine (ZyrTEC) 10 MG tablet Take 1 tablet by mouth As Needed.      omeprazole (priLOSEC) 40 MG capsule Take 1 capsule by mouth Daily. 90 capsule 3    PREMPRO 0.3-1.5 MG per tablet Take 1 tablet by mouth Daily.      rosuvastatin (CRESTOR) 5 MG tablet TAKE 1 TABLET BY MOUTH DAILY 90 tablet 1     No facility-administered medications prior to visit.     No opioid medication identified on active medication list. I have reviewed chart for other potential  high risk medication/s and harmful drug interactions in the elderly.      Aspirin is not on active medication list.  Aspirin use is not indicated based on review of current medical condition/s. Risk of harm outweighs potential benefits.  .    Patient Active Problem List   Diagnosis    Unspecified inflammatory spondylopathy, lumbosacral region    Positive colorectal cancer screening using Cologuard test    Back pain    Hyperlipidemia    Facet arthritis of lumbar region    GERD  "(gastroesophageal reflux disease)    Gastroesophageal reflux disease    Fatty liver    Diverticulosis    Tear of meniscus of right knee    Medicare annual wellness visit, subsequent    Tinnitus aurium, right    Spondylolisthesis of lumbar region     Advance Care Planning Advance Directive is not on file.  ACP discussion was held with the patient during this visit. Patient has an advance directive (not in EMR), copy requested.            Objective   Vitals:    24 1128   BP: 124/82   BP Location: Left arm   Patient Position: Sitting   Cuff Size: Adult   Pulse: 64   SpO2: 97%   Weight: 84.4 kg (186 lb)       Estimated body mass index is 28.28 kg/m² as calculated from the following:    Height as of 23: 172.7 cm (68\").    Weight as of this encounter: 84.4 kg (186 lb).            Does the patient have evidence of cognitive impairment? No                                                                                                Health  Risk Assessment    Smoking Status:  Social History     Tobacco Use   Smoking Status Never   Smokeless Tobacco Never     Alcohol Consumption:  Social History     Substance and Sexual Activity   Alcohol Use Yes    Alcohol/week: 10.0 standard drinks of alcohol    Types: 10 Glasses of wine per week    Comment: daily       Fall Risk Screen  STEADI Fall Risk Assessment was completed, and patient is at LOW risk for falls.Assessment completed on:2024    Depression Screenin/13/2024    11:35 AM   PHQ-2/PHQ-9 Depression Screening   Little Interest or Pleasure in Doing Things 0-->not at all   Feeling Down, Depressed or Hopeless 0-->not at all   PHQ-9: Brief Depression Severity Measure Score 0     Health Habits and Functional and Cognitive Screenin/11/2024     9:04 AM   Functional & Cognitive Status   Do you have difficulty preparing food and eating? No   Do you have difficulty bathing yourself, getting dressed or grooming yourself? No   Do you have difficulty using " the toilet? No   Do you have difficulty moving around from place to place? No   Do you have trouble with steps or getting out of a bed or a chair? No   Current Diet Well Balanced Diet   Dental Exam Up to date   Eye Exam Up to date   Exercise (times per week) 2 times per week   Current Exercises Include Stationary Bicycling/Spin Class   Do you need help using the phone?  No   Are you deaf or do you have serious difficulty hearing?  No   Do you need help to go to places out of walking distance? No   Do you need help shopping? No   Do you need help preparing meals?  No   Do you need help with housework?  No   Do you need help with laundry? No   Do you need help taking your medications? No   Do you need help managing money? No   Do you ever drive or ride in a car without wearing a seat belt? No   Have you felt unusual stress, anger or loneliness in the last month? No   Who do you live with? Spouse   If you need help, do you have trouble finding someone available to you? No   Have you been bothered in the last four weeks by sexual problems? No   Do you have difficulty concentrating, remembering or making decisions? No           Age-appropriate Screening Schedule:  Refer to the list below for future screening recommendations based on patient's age, sex and/or medical conditions. Orders for these recommended tests are listed in the plan section. The patient has been provided with a written plan.    Health Maintenance List  Health Maintenance   Topic Date Due    PAP SMEAR  Never done    Pneumococcal Vaccine 65+ (2 of 2 - PPSV23 or PCV20) 09/16/2019    DXA SCAN  08/03/2020    COVID-19 Vaccine (6 - 2023-24 season) 02/17/2024    INFLUENZA VACCINE  08/01/2024    LIPID PANEL  02/13/2025    ANNUAL WELLNESS VISIT  08/13/2025    TDAP/TD VACCINES (2 - Td or Tdap) 10/09/2025    MAMMOGRAM  02/05/2026    COLORECTAL CANCER SCREENING  04/16/2026    HEPATITIS C SCREENING  Completed    ZOSTER VACCINE  Completed                                                                                                                                                 CMS Preventative Services Quick Reference  Risk Factors Identified During Encounter  Fall Risk-High or Moderate: Discussed Fall Prevention in the home    The above risks/problems have been discussed with the patient.  Pertinent information has been shared with the patient in the After Visit Summary.  An After Visit Summary and PPPS were made available to the patient.    Follow Up:   Next Medicare Wellness visit to be scheduled in 1 year.         Additional E&M Note during same encounter follows:  Patient has additional, significant, and separately identifiable condition(s)/problem(s) that require work above and beyond the Medicare Wellness Visit     Chief Complaint  Medicare Wellness-subsequent    Subjective   Sharlene is a delightful lady who does a lot of her family.  She does exercise periodically and has had an epidural for back pain but so far is doing well with some mild spinal stenosis.  Otherwise treatment of blood pressure is with a minimal dose of amlodipine 2.5 mg daily +5 mg of rosuvastatin for cholesterol.  Will get labs today and see where everything is as far as cholesterol etc.  She has some caregiving concerns as far as her 's memory we discussed that at length.      Sosa is also being seen today for an annual adult preventative physical exam.  and Sosa is also being seen today for additional medical problem/s.    Review of Systems   All other systems reviewed and are negative.             Objective   Vital Signs:  /82 (BP Location: Left arm, Patient Position: Sitting, Cuff Size: Adult)   Pulse 64   Wt 84.4 kg (186 lb)   SpO2 97%   BMI 28.28 kg/m²   Physical Exam  Vitals reviewed.   Constitutional:       Appearance: She is well-developed.   HENT:      Head: Normocephalic and atraumatic.      Right Ear: Tympanic membrane and external ear normal.      Left Ear: Tympanic  membrane and external ear normal.      Nose: Nose normal.   Eyes:      Conjunctiva/sclera: Conjunctivae normal.      Pupils: Pupils are equal, round, and reactive to light.   Neck:      Thyroid: No thyromegaly.      Vascular: No JVD.   Cardiovascular:      Rate and Rhythm: Normal rate and regular rhythm.      Heart sounds: Normal heart sounds.   Pulmonary:      Effort: Pulmonary effort is normal.      Breath sounds: Normal breath sounds.   Abdominal:      General: Bowel sounds are normal.      Palpations: Abdomen is soft.   Musculoskeletal:         General: Normal range of motion.      Cervical back: Normal range of motion and neck supple.      Lumbar back: Decreased range of motion.   Lymphadenopathy:      Cervical: No cervical adenopathy.   Skin:     General: Skin is warm and dry.      Findings: No rash.   Neurological:      Mental Status: She is alert and oriented to person, place, and time.      Cranial Nerves: No cranial nerve deficit.      Coordination: Coordination normal.   Psychiatric:         Behavior: Behavior normal.         Thought Content: Thought content normal.         Judgment: Judgment normal.                 Assessment and Plan               Mixed hyperlipidemia    rosuvastatin 5 mg daily  Fatty liver  Check liver enzymes  Gastroesophageal reflux disease without esophagitis  Omeprazole 40 mg daily  Spondylolisthesis of lumbar region  Is done well with exercise post epidural  Primary hypertension   amlodipine 2.5 mg daily  Medicare annual wellness visit, subsequent    B12 deficiency    Elevated glucose      Orders Placed This Encounter   Procedures    Urinalysis With Microscopic If Indicated (No Culture) - Urine, Clean Catch     Order Specific Question:   Release to patient     Answer:   Routine Release [[id number]]    TSH     Order Specific Question:   Release to patient     Answer:   Routine Release [[id number]]    T4, Free     Order Specific Question:   Release to patient     Answer:   Routine  Release [[id number]]    Lipid Panel With / Chol / HDL Ratio     Order Specific Question:   Release to patient     Answer:   Routine Release [1400000002]    Comprehensive Metabolic Panel     Order Specific Question:   Release to patient     Answer:   Routine Release [1400000002]    Hemoglobin A1c     Order Specific Question:   Release to patient     Answer:   Routine Release [1400000002]    Vitamin B12     Order Specific Question:   Release to patient     Answer:   Routine Release [1400000002]    CBC & Differential     Order Specific Question:   Manual Differential     Answer:   No     Order Specific Question:   Release to patient     Answer:   Routine Release [1400000002]             Follow Up   No follow-ups on file.  Patient was given instructions and counseling regarding her condition or for health maintenance advice. Please see specific information pulled into the AVS if appropriate.

## 2024-08-16 DIAGNOSIS — R09.89 LABILE BLOOD PRESSURE: ICD-10-CM

## 2024-08-16 DIAGNOSIS — F41.9 ANXIETY: ICD-10-CM

## 2024-08-16 RX ORDER — ALPRAZOLAM 0.5 MG/1
0.5 TABLET ORAL NIGHTLY PRN
Qty: 30 TABLET | Refills: 5 | Status: SHIPPED | OUTPATIENT
Start: 2024-08-16

## 2024-08-18 DIAGNOSIS — K21.9 GASTROESOPHAGEAL REFLUX DISEASE: ICD-10-CM

## 2024-08-19 RX ORDER — OMEPRAZOLE 40 MG/1
40 CAPSULE, DELAYED RELEASE ORAL DAILY
Qty: 90 CAPSULE | Refills: 3 | OUTPATIENT
Start: 2024-08-19

## 2024-08-21 DIAGNOSIS — K21.9 GASTROESOPHAGEAL REFLUX DISEASE: ICD-10-CM

## 2024-08-21 RX ORDER — OMEPRAZOLE 40 MG/1
40 CAPSULE, DELAYED RELEASE ORAL DAILY
Qty: 90 CAPSULE | Refills: 3 | OUTPATIENT
Start: 2024-08-21

## 2024-08-28 DIAGNOSIS — K21.9 GASTROESOPHAGEAL REFLUX DISEASE: ICD-10-CM

## 2024-08-29 RX ORDER — OMEPRAZOLE 40 MG/1
40 CAPSULE, DELAYED RELEASE ORAL DAILY
Qty: 90 CAPSULE | Refills: 0 | Status: SHIPPED | OUTPATIENT
Start: 2024-08-29

## 2024-09-06 ENCOUNTER — TELEPHONE (OUTPATIENT)
Dept: GASTROENTEROLOGY | Facility: CLINIC | Age: 74
End: 2024-09-06

## 2024-09-06 ENCOUNTER — APPOINTMENT (OUTPATIENT)
Dept: CT IMAGING | Facility: HOSPITAL | Age: 74
End: 2024-09-06
Payer: MEDICARE

## 2024-09-06 ENCOUNTER — HOSPITAL ENCOUNTER (EMERGENCY)
Facility: HOSPITAL | Age: 74
Discharge: HOME | End: 2024-09-06
Attending: EMERGENCY MEDICINE
Payer: MEDICARE

## 2024-09-06 VITALS
OXYGEN SATURATION: 99 % | HEIGHT: 68 IN | SYSTOLIC BLOOD PRESSURE: 137 MMHG | RESPIRATION RATE: 16 BRPM | TEMPERATURE: 98.5 F | DIASTOLIC BLOOD PRESSURE: 76 MMHG | BODY MASS INDEX: 28.04 KG/M2 | WEIGHT: 185 LBS | HEART RATE: 71 BPM

## 2024-09-06 DIAGNOSIS — R10.32 LEFT LOWER QUADRANT PAIN: Primary | ICD-10-CM

## 2024-09-06 LAB
ALBUMIN SERPL-MCNC: 4.3 G/DL (ref 3.5–5.2)
ALBUMIN/GLOB SERPL: 1.9 G/DL
ALP SERPL-CCNC: 61 U/L (ref 39–117)
ALT SERPL W P-5'-P-CCNC: 23 U/L (ref 1–33)
ANION GAP SERPL CALCULATED.3IONS-SCNC: 8 MMOL/L (ref 5–15)
AST SERPL-CCNC: 23 U/L (ref 1–32)
BASOPHILS # BLD AUTO: 0.02 10*3/MM3 (ref 0–0.2)
BASOPHILS NFR BLD AUTO: 0.2 % (ref 0–1.5)
BILIRUB SERPL-MCNC: 0.3 MG/DL (ref 0–1.2)
BILIRUB UR QL STRIP: NEGATIVE
BUN SERPL-MCNC: 9 MG/DL (ref 8–23)
BUN/CREAT SERPL: 11.8 (ref 7–25)
CALCIUM SPEC-SCNC: 9.7 MG/DL (ref 8.6–10.5)
CHLORIDE SERPL-SCNC: 105 MMOL/L (ref 98–107)
CLARITY UR: CLEAR
CO2 SERPL-SCNC: 24 MMOL/L (ref 22–29)
COLOR UR: YELLOW
CREAT SERPL-MCNC: 0.76 MG/DL (ref 0.57–1)
DEPRECATED RDW RBC AUTO: 45 FL (ref 37–54)
EGFRCR SERPLBLD CKD-EPI 2021: 83.4 ML/MIN/1.73
EOSINOPHIL # BLD AUTO: 0.04 10*3/MM3 (ref 0–0.4)
EOSINOPHIL NFR BLD AUTO: 0.5 % (ref 0.3–6.2)
ERYTHROCYTE [DISTWIDTH] IN BLOOD BY AUTOMATED COUNT: 12.9 % (ref 12.3–15.4)
GLOBULIN UR ELPH-MCNC: 2.3 GM/DL
GLUCOSE SERPL-MCNC: 118 MG/DL (ref 65–99)
GLUCOSE UR STRIP-MCNC: NEGATIVE MG/DL
HCT VFR BLD AUTO: 40.3 % (ref 34–46.6)
HGB BLD-MCNC: 13.7 G/DL (ref 12–15.9)
HGB UR QL STRIP.AUTO: NEGATIVE
IMM GRANULOCYTES # BLD AUTO: 0.01 10*3/MM3 (ref 0–0.05)
IMM GRANULOCYTES NFR BLD AUTO: 0.1 % (ref 0–0.5)
KETONES UR QL STRIP: NEGATIVE
LEUKOCYTE ESTERASE UR QL STRIP.AUTO: NEGATIVE
LIPASE SERPL-CCNC: 27 U/L (ref 13–60)
LYMPHOCYTES # BLD AUTO: 2.51 10*3/MM3 (ref 0.7–3.1)
LYMPHOCYTES NFR BLD AUTO: 29 % (ref 19.6–45.3)
MCH RBC QN AUTO: 32 PG (ref 26.6–33)
MCHC RBC AUTO-ENTMCNC: 34 G/DL (ref 31.5–35.7)
MCV RBC AUTO: 94.2 FL (ref 79–97)
MONOCYTES # BLD AUTO: 0.65 10*3/MM3 (ref 0.1–0.9)
MONOCYTES NFR BLD AUTO: 7.5 % (ref 5–12)
NEUTROPHILS NFR BLD AUTO: 5.43 10*3/MM3 (ref 1.7–7)
NEUTROPHILS NFR BLD AUTO: 62.7 % (ref 42.7–76)
NITRITE UR QL STRIP: NEGATIVE
PH UR STRIP.AUTO: 6 [PH] (ref 5–8)
PLATELET # BLD AUTO: 286 10*3/MM3 (ref 140–450)
PMV BLD AUTO: 8.7 FL (ref 6–12)
POTASSIUM SERPL-SCNC: 3.5 MMOL/L (ref 3.5–5.2)
PROT SERPL-MCNC: 6.6 G/DL (ref 6–8.5)
PROT UR QL STRIP: NEGATIVE
RBC # BLD AUTO: 4.28 10*6/MM3 (ref 3.77–5.28)
SODIUM SERPL-SCNC: 137 MMOL/L (ref 136–145)
SP GR UR STRIP: <=1.005 (ref 1–1.03)
UROBILINOGEN UR QL STRIP: NORMAL
WBC NRBC COR # BLD AUTO: 8.66 10*3/MM3 (ref 3.4–10.8)

## 2024-09-06 PROCEDURE — 99283 EMERGENCY DEPT VISIT LOW MDM: CPT | Performed by: PHYSICIAN ASSISTANT

## 2024-09-06 PROCEDURE — 85025 COMPLETE CBC W/AUTO DIFF WBC: CPT | Performed by: PHYSICIAN ASSISTANT

## 2024-09-06 PROCEDURE — 80053 COMPREHEN METABOLIC PANEL: CPT | Performed by: PHYSICIAN ASSISTANT

## 2024-09-06 PROCEDURE — 81003 URINALYSIS AUTO W/O SCOPE: CPT | Performed by: PHYSICIAN ASSISTANT

## 2024-09-06 PROCEDURE — 25510000001 IOPAMIDOL PER 1 ML: Performed by: EMERGENCY MEDICINE

## 2024-09-06 PROCEDURE — 74177 CT ABD & PELVIS W/CONTRAST: CPT

## 2024-09-06 PROCEDURE — 99285 EMERGENCY DEPT VISIT HI MDM: CPT

## 2024-09-06 PROCEDURE — 83690 ASSAY OF LIPASE: CPT | Performed by: PHYSICIAN ASSISTANT

## 2024-09-06 PROCEDURE — 25810000003 SODIUM CHLORIDE 0.9 % SOLUTION: Performed by: PHYSICIAN ASSISTANT

## 2024-09-06 RX ORDER — ACETAMINOPHEN 500 MG
1000 TABLET ORAL ONCE
Status: COMPLETED | OUTPATIENT
Start: 2024-09-06 | End: 2024-09-06

## 2024-09-06 RX ORDER — IOPAMIDOL 755 MG/ML
100 INJECTION, SOLUTION INTRAVASCULAR
Status: COMPLETED | OUTPATIENT
Start: 2024-09-06 | End: 2024-09-06

## 2024-09-06 RX ADMIN — IOPAMIDOL 85 ML: 755 INJECTION, SOLUTION INTRAVENOUS at 15:04

## 2024-09-06 RX ADMIN — ACETAMINOPHEN 1000 MG: 500 TABLET ORAL at 14:43

## 2024-09-06 RX ADMIN — SODIUM CHLORIDE 500 ML: 9 INJECTION, SOLUTION INTRAVENOUS at 14:43

## 2024-09-06 NOTE — TELEPHONE ENCOUNTER
Per IM from KACIE Shah  Read the message--I think she should go to the ER given her history of diverticulitis with previous colon resection. They can do a stat CT scan there and blood work and treat her appropriately.    CHIP  Thank you for sending me a secure chat on this

## 2024-09-06 NOTE — DISCHARGE INSTRUCTIONS
Continue with MiraLAX and probiotic.  Recommend close follow-up with the primary physician and your GI doctor.  Return to the ER with worsening pain, fever, development of new symptoms, or should you have any further concerns.

## 2024-09-06 NOTE — TELEPHONE ENCOUNTER
Delete after reviewing: Send to the appropriate pool. Check your call action grid or workflows.    Caller:  CAMRON HAN    Relationship: SELF    Best call back number:[phone]    What is your medical concern? DIVERTICULITIS SYMPTOMS, NOT GETTING ANY BETTER. HAVING TROUBLE GOING TO THE BATHROOM. HAS TRIED HERBAL TEA AND MIRALAX EVERY DAY AND IT'S NOT REALLY HELPING. SHOULD SHE TAKE MAGNESIUM CITRATE OR AN ANTIBIOTIC?    How long has this issue been going on? 2 WEEKS     Is your provider already aware of this issue?     Have you been treated for this issue? YES

## 2024-09-06 NOTE — TELEPHONE ENCOUNTER
"Called pt, she states she is having the same sx as when she has had diverticulitis in the past. States she has decreased appetite, difficulty having a BM, feels tired, body aches  x2 weeks.  Pt states she has not taken temperature but feels \"feverish\". Pt has taken Miralax and smooth move tea and is having small BM's. States pain is in LLQ and lower abdomen, also feels like there is pressure on her bladder. Pt denies blood in stool. Advised will send a msg to KACIE Shah for recommendations.  "

## 2024-09-06 NOTE — [facility] PROVIDER NOTE
EMERGENCY DEPARTMENT ENCOUNTER    Room Number:  01/01  Date seen:  9/6/2024  Time seen: 15:20 EDT  PCP: Zaheer Mccrary MD  Historian: Patient    Discussed/obtained information from independent historians: N/A    HPI:  Chief complaint: Left lower quadrant pain and chills  A complete HPI/ROS/PMH/PSH/SH/FH are unobtainable due to: Nothing  Context:Sosa Diamond is a 74 y.o. female who presents to the ED with c/o left lower quadrant pain and chills.  Patient reports symptoms ongoing for roughly 2 weeks.  Pain does wax and wane in severity.  It is described as sharp.  No blood in the stool or abnormal bowel movements.  No dysuria or hematuria.  No nausea vomiting.  Patient reports she has a past medical history of diverticulitis and this feels fairly similar.  She spoke with her primary care physician earlier today and it was recommended she reports to the ED for CT scan and further evaluation.    Patient followed by Dr. Zaheer Mccrary in the family medicine setting.  Patient presented for a medical wellness visit 8/13/2024.  Helpful said to be unchanged.  It appears routine labs were obtained.    Chronic or social conditions impacting care:    ALLERGIES  Doxycycline    PAST MEDICAL HISTORY  Active Ambulatory Problems     Diagnosis Date Noted    Unspecified inflammatory spondylopathy, lumbosacral region 01/22/2020    Positive colorectal cancer screening using Cologuard test 02/13/2020    Back pain 07/22/2020    Hyperlipidemia 07/22/2020    Facet arthritis of lumbar region 07/22/2020    GERD (gastroesophageal reflux disease)     Gastroesophageal reflux disease 02/24/2021    Fatty liver 02/24/2021    Diverticulosis 02/24/2021    Tear of meniscus of right knee 02/08/2023    Medicare annual wellness visit, subsequent 02/08/2023    Tinnitus aurium, right 02/13/2024    Spondylolisthesis of lumbar region 02/13/2024     Resolved Ambulatory Problems     Diagnosis Date Noted    No Resolved Ambulatory Problems     Past Medical  History:   Diagnosis Date    Allergic     Chicken pox     Condition not found     Constipation     Decreased visual acuity     Diverticulitis     Eye pain     Fatigue     Flushing     Hayfever     Heartburn     Hemorrhoids     Hernia 1999    Herpes     History of CT scan of abdomen 07/30/2013    History of EKG     History of mammogram     History of Papanicolaou smear of cervix     History of TB skin testing 10/09/2014    Measles     Melanoma     Mumps     Night sweats     Pregnancy     Sinus trouble     Skin melanoma 03/2014    Sleep trouble     Visual impairment     Weight gain        PAST SURGICAL HISTORY  Past Surgical History:   Procedure Laterality Date    CATARACT EXTRACTION, BILATERAL Bilateral 02/2021    COLON RESECTION  12/15/2010    Event: laparoscopic sigmoid colectomy  Provider: Elizabeth Rudd MD    COLONOSCOPY  08/19/2006    multiple sigm tics, random path neg    COLONOSCOPY  2006    COLONOSCOPY N/A 04/16/2021    Procedure: COLONOSCOPY TO CECUM WITH POLYPECTOMY (COLD) and NS INJECTION (1CC);  Surgeon: Vincent Greenwood MD;  Location:  [facility];  Service: Gastroenterology;  Laterality: N/A;  PREOP/ HX OF DIVERTICULITIS  POSTOP/ DIVERTICULOSIS, POLYPS, INTERNAL HEMORRHOIDS    ENDOSCOPY  08/19/2006    EGD-erythamatous gastropathy, neg celiac, neg h pylori    ENDOSCOPY N/A 04/16/2021    Procedure: ESOPHAGOGASTRODUODENOSCOPY WITH BX;  Surgeon: Vincent Greenwood MD;  Location:  [facility];  Service: Gastroenterology;  Laterality: N/A;  PREOP/ DYSPEPSIA  POSTOP/ GASTRIC POLYP, GASTRITIS    FEMORAL HERNIA REPAIR  2000    HEMORRHOIDECTOMY      HERNIA REPAIR Right 1999    hernia in R thigh    OTHER SURGICAL HISTORY Right     MELANOMA RIGHT LEG    PAP SMEAR  05/01/2014    DR MANZANARES       FAMILY HISTORY  Family History   Problem Relation Age of Onset    Arthritis Father         osteoarthritis    Cancer Father         esophageal    Hypertension Father     Ulcers Father         gastric    Glaucoma Father          also macular degeneration    Alcohol abuse Father     Colon polyps Father         colon polyps but no cancer    Arthritis Paternal Grandmother         rheumatoid    Alcohol abuse Paternal Uncle     Breast cancer Paternal Aunt     Arthritis Maternal Grandmother     Malig Hyperthermia Neg Hx        SOCIAL HISTORY  Social History     Socioeconomic History    Marital status:    Tobacco Use    Smoking status: Never    Smokeless tobacco: Never   Vaping Use    Vaping status: Never Used   Substance and Sexual Activity    Alcohol use: Yes     Alcohol/week: 10.0 standard drinks of alcohol     Types: 10 Glasses of wine per week     Comment: daily    Drug use: No    Sexual activity: Defer       REVIEW OF SYSTEMS  Review of Systems    All systems reviewed and negative except for those discussed in HPI.     PHYSICAL EXAM    I have reviewed the triage vital signs and nursing notes.  Vitals:    09/06/24 1410   BP: 139/88   Pulse: 89   Resp: 16   Temp: 98.5 °F (36.9 °C)   SpO2: 99%     Physical Exam    GENERAL: Very pleasant, nontoxic not distressed  HENT: nares patent  EYES: no scleral icterus  NECK: no ROM limitations  CV: regular rhythm, regular rate  RESPIRATORY: normal effort  ABDOMEN: TTP left lower abdomen, no guarding, no rebound, no mass noted.  : deferred  MUSCULOSKELETAL: no deformity  NEURO: alert, moves all extremities, follows commands  SKIN: warm, dry    LAB RESULTS  Recent Results (from the past 24 hour(s))   Comprehensive Metabolic Panel    Collection Time: 09/06/24  2:42 PM    Specimen: Blood   Result Value Ref Range    Glucose 118 (H) 65 - 99 mg/dL    BUN 9 8 - 23 mg/dL    Creatinine 0.76 0.57 - 1.00 mg/dL    Sodium 137 136 - 145 mmol/L    Potassium 3.5 3.5 - 5.2 mmol/L    Chloride 105 98 - 107 mmol/L    CO2 24.0 22.0 - 29.0 mmol/L    Calcium 9.7 8.6 - 10.5 mg/dL    Total Protein 6.6 6.0 - 8.5 g/dL    Albumin 4.3 3.5 - 5.2 g/dL    ALT (SGPT) 23 1 - 33 U/L    AST (SGOT) 23 1 - 32 U/L    Alkaline  Phosphatase 61 39 - 117 U/L    Total Bilirubin 0.3 0.0 - 1.2 mg/dL    Globulin 2.3 gm/dL    A/G Ratio 1.9 g/dL    BUN/Creatinine Ratio 11.8 7.0 - 25.0    Anion Gap 8.0 5.0 - 15.0 mmol/L    eGFR 83.4 >60.0 mL/min/1.73   Lipase    Collection Time: 09/06/24  2:42 PM    Specimen: Blood   Result Value Ref Range    Lipase 27 13 - 60 U/L   CBC Auto Differential    Collection Time: 09/06/24  2:42 PM    Specimen: Blood   Result Value Ref Range    WBC 8.66 3.40 - 10.80 10*3/mm3    RBC 4.28 3.77 - 5.28 10*6/mm3    Hemoglobin 13.7 12.0 - 15.9 g/dL    Hematocrit 40.3 34.0 - 46.6 %    MCV 94.2 79.0 - 97.0 fL    MCH 32.0 26.6 - 33.0 pg    MCHC 34.0 31.5 - 35.7 g/dL    RDW 12.9 12.3 - 15.4 %    RDW-SD 45.0 37.0 - 54.0 fl    MPV 8.7 6.0 - 12.0 fL    Platelets 286 140 - 450 10*3/mm3    Neutrophil % 62.7 42.7 - 76.0 %    Lymphocyte % 29.0 19.6 - 45.3 %    Monocyte % 7.5 5.0 - 12.0 %    Eosinophil % 0.5 0.3 - 6.2 %    Basophil % 0.2 0.0 - 1.5 %    Immature Grans % 0.1 0.0 - 0.5 %    Neutrophils, Absolute 5.43 1.70 - 7.00 10*3/mm3    Lymphocytes, Absolute 2.51 0.70 - 3.10 10*3/mm3    Monocytes, Absolute 0.65 0.10 - 0.90 10*3/mm3    Eosinophils, Absolute 0.04 0.00 - 0.40 10*3/mm3    Basophils, Absolute 0.02 0.00 - 0.20 10*3/mm3    Immature Grans, Absolute 0.01 0.00 - 0.05 10*3/mm3   Urinalysis without microscopic (no culture) - Urine, Clean Catch    Collection Time: 09/06/24  4:02 PM    Specimen: Urine, Clean Catch   Result Value Ref Range    Color, UA Yellow Yellow, Straw    Appearance, UA Clear Clear    pH, UA 6.0 5.0 - 8.0    Specific Gravity, UA <=1.005 1.005 - 1.030    Glucose, UA Negative Negative    Ketones, UA Negative Negative    Bilirubin, UA Negative Negative    Blood, UA Negative Negative    Protein, UA Negative Negative    Leuk Esterase, UA Negative Negative    Nitrite, UA Negative Negative    Urobilinogen, UA 0.2 E.U./dL 0.2 - 1.0 E.U./dL       Ordered the above labs and independently interpreted results.  My findings will  be discussed in the ED course or medical decision making section below    RADIOLOGY RESULTS  CT Abdomen Pelvis With Contrast    Result Date: 9/6/2024  CT ABDOMEN AND PELVIS WITH IV CONTRAST  HISTORY: Left lower quadrant pain and fever  TECHNIQUE: Radiation dose reduction techniques were utilized, including automated exposure control and exposure modulation based on body size. Axial images were obtained through the abdomen and pelvis after the administration of IV contrast. Coronal and sagittal reformatted images obtained.  COMPARISON: None available  FINDINGS:  ABDOMEN: There is mild atelectasis or scarring in the lung bases. Low-attenuation of the liver suggesting fatty infiltration. The gallbladder is unremarkable. The spleen is normal in size. There are some small parapelvic cysts in the left kidney. The right kidney appears unremarkable. The adrenal glands are unremarkable. The pancreas is unremarkable. There is a small fat-containing ventral abdominal hernia above the umbilicus. There is also a small fat-containing umbilical hernia.  PELVIS: The bladder is unremarkable. There are multiple small enhancing lesions in the uterus which are most likely small fibroids. There is no free fluid in the pelvis. Postoperative changes of the sigmoid colon are demonstrated. Mild sigmoid diverticulosis without evidence of diverticulitis. Appendix is normal. No acute bony abnormality      1. No acute findings 2. Fatty infiltration of the liver 3. Postoperative changes of the sigmoid colon. There is mild sigmoid diverticulosis without evidence of diverticulitis 4. Small fat-containing abdominal wall hernias as described 5. Additional findings as described  Radiation dose reduction techniques were utilized, including automated exposure control and exposure modulation based on body size.   This report was finalized on 9/6/2024 3:28 PM by Dr. Elieser Garcia M.D on Workstation: [id number]        Ordered the above noted  radiological studies.  Independently interpreted by me.  My findings will be discussed in the medical decision section below.     PROGRESS, DATA ANALYSIS, CONSULTS AND MEDICAL DECISION MAKING    Please note that this section constitutes my independent interpretation of clinical data including lab results, radiology, EKG's.  This constitutes my independent professional opinion regarding differential diagnosis and management of this patient.  It may include any factors such as history from outside sources, review of external records, social determinants of health, management of medications, response to those treatments, and discussions with other providers.    ED Course as of 09/06/24 1633   Fri Sep 06, 2024   1610 IMPRESSION:  1. No acute findings  2. Fatty infiltration of the liver  3. Postoperative changes of the sigmoid colon. There is mild sigmoid  diverticulosis without evidence of diverticulitis  4. Small fat-containing abdominal wall hernias as described  5. Additional findings as described     Radiation dose reduction techniques were utilized, including automated  exposure control and exposure modulation based on body size.        This report was finalized on 9/6/2024 3:28 PM by Dr. Elieser Garcia M.D  on Workstation: [id number]   [RC]   1630 WBC: 8.66 [RC]   1630 Hemoglobin: 13.7 [RC]   1630 Platelets: 286 [RC]   1630 Glucose(!): 118 [RC]   1630 BUN: 9 [RC]   1630 Creatinine: 0.76 [RC]   1630 Sodium: 137 [RC]   1630 Potassium: 3.5 [RC]   1630 ALT (SGPT): 23 [RC]   1630 AST (SGOT): 23 [RC]   1630 Alkaline Phosphatase: 61 [RC]   1630 Total Bilirubin: 0.3 [RC]   1630 Lipase: 27 [RC]   1630 Blood, UA: Negative [RC]   1630 Ketones, UA: Negative [RC]   1630 Nitrite, UA: Negative [RC]   1631 Workup negative.  Patient's pain has been ongoing for a few weeks.  She does report change in stool caliber and consistency.  She reports she feels she has been constipated.  Exam is benign.  Plan to treat with a short  course of MiraLAX and will ensure she follows up closely with Dr. Iarheta.  Patient knows to return to the ED if worsening symptoms, fever, or should she have any further concerns. [RC]      ED Course User Index  [RC] Mikey Short III, PA     Orders placed during this visit:  Orders Placed This Encounter   Procedures    CT Abdomen Pelvis With Contrast    Comprehensive Metabolic Panel    Lipase    Urinalysis without microscopic (no culture) - Urine, Clean Catch    CBC Auto Differential    CBC & Differential    ED Acknowledgement Form Needed;            Medical Decision Making  Problems Addressed:  Left lower quadrant pain: complicated acute illness or injury    Amount and/or Complexity of Data Reviewed  Labs: ordered. Decision-making details documented in ED Course.  Radiology: ordered.    Risk  OTC drugs.  Prescription drug management.    DDx: Acute diverticulitis, epiploic appendagitis, AAA, cystitis, ureteral stone, acute pancreatitis, SBO.  Will obtain CBC, CMP, lipase, UA, CT abdomen pelvis with IV contrast to further evaluate.        DIAGNOSIS  Final diagnoses:   Left lower quadrant pain          Medication List      No changes were made to your prescriptions during this visit.         FOLLOW-UP  No follow-up provider specified.      Latest Documented Vital Signs:  As of 16:33 EDT  BP- 139/88 HR- 89 Temp- 98.5 °F (36.9 °C) O2 sat- 99%    Appropriate PPE utilized throughout this patient encounter to include mask, if indicated, per current protocol. Hand hygiene was performed before donning PPE and after removal when leaving the room.    Please note that portions of this were completed with a voice recognition program.     Note Disclaimer: At [facility], we believe that sharing information builds trust and better relationships. You are receiving this note because you are receiving care at [facility] or recently visited. It is possible you will see health information before a provider has talked  with you about it. This kind of information can be easy to misunderstand. To help you fully understand what it means for your health, we urge you to discuss this note with your provider.

## 2024-09-06 NOTE — TELEPHONE ENCOUNTER
Called pt and advised of Alyssa Avilez PA-C's note.  Pt verbalized understanding.    Pt will go to the  ER on [address].     Update sent to KACIE Shah.

## 2024-10-09 ENCOUNTER — OFFICE VISIT (OUTPATIENT)
Dept: GASTROENTEROLOGY | Facility: CLINIC | Age: 74
End: 2024-10-09
Payer: MEDICARE

## 2024-10-09 VITALS
WEIGHT: 187.8 LBS | HEART RATE: 81 BPM | BODY MASS INDEX: 28.46 KG/M2 | SYSTOLIC BLOOD PRESSURE: 131 MMHG | OXYGEN SATURATION: 97 % | DIASTOLIC BLOOD PRESSURE: 76 MMHG | HEIGHT: 68 IN

## 2024-10-09 DIAGNOSIS — Z87.19 HISTORY OF DIVERTICULITIS: ICD-10-CM

## 2024-10-09 DIAGNOSIS — K21.9 GASTROESOPHAGEAL REFLUX DISEASE WITHOUT ESOPHAGITIS: Primary | ICD-10-CM

## 2024-10-09 DIAGNOSIS — K21.9 GASTROESOPHAGEAL REFLUX DISEASE: ICD-10-CM

## 2024-10-09 RX ORDER — OMEPRAZOLE 40 MG/1
40 CAPSULE, DELAYED RELEASE ORAL DAILY
Qty: 90 CAPSULE | Refills: 3 | Status: SHIPPED | OUTPATIENT
Start: 2024-10-09

## 2024-10-09 NOTE — PROGRESS NOTES
"Chief Complaint  Heartburn    Subjective          History of Present Illness    Sosa Diamond is a  74 y.o. female presents for follow-up on GERD.  She has history of diverticulitis s/p sigmoid colon resection.  She is a patient of Dr. Greenwood last seen by me on 6/13/2023.    She went to the ED on 9/6/2024 with left lower quadrant pain and chills.  Symptoms started 3 weeks prior with skinny stools. Took miralax multiple times which didn't seem to help. CT scan showed no acute abnormality.  No evidence of diverticulitis or colitis.  Labs showed normal CBC, CMP, and lipase.  She reports symptoms improved since that time. She has had 3 episodes of LLQ pain, lower abdominal pain, chills, achiness similar to previous diverticulitis episodes she had prior to colectomy. This has been occurring for the last year.     She is a history of GERD and gastritis.  Takes omeprazole 40 mg daily which works well for her.  She denies breakthrough reflux symptoms.    From 6/13/2023 office note:  4/16/21 EGD showed gastric polyp and gastritis.  Path unremarkable.  Colonoscopy at the same time showed diverticulosis, nonbleeding internal hemorrhoids, 1 adenomatous colon polyp.  Recall 5 years.    Objective   Vital Signs:   /76 (Patient Position: Sitting, Cuff Size: Adult)   Pulse 81   Ht 172.7 cm (67.99\")   Wt 85.2 kg (187 lb 12.8 oz)   SpO2 97%   BMI 28.56 kg/m²       Physical Exam  Vitals reviewed.   Constitutional:       General: She is awake. She is not in acute distress.     Appearance: Normal appearance. She is well-developed and well-groomed.   HENT:      Head: Normocephalic.   Pulmonary:      Effort: Pulmonary effort is normal. No respiratory distress.   Skin:     Coloration: Skin is not pale.   Neurological:      Mental Status: She is alert and oriented to person, place, and time.      Gait: Gait is intact.   Psychiatric:         Mood and Affect: Mood and affect normal.         Speech: Speech normal.         Behavior: " Behavior is cooperative.         Judgment: Judgment normal.          Result Review :             Assessment and Plan    Diagnoses and all orders for this visit:    1. Gastroesophageal reflux disease without esophagitis (Primary)    2. History of diverticulitis    3. Gastroesophageal reflux disease  -     omeprazole (priLOSEC) 40 MG capsule; Take 1 capsule by mouth Daily.  Dispense: 90 capsule; Refill: 3    Offered colonoscopy but she would like to hold off for now.     Recommend aggressively treating constipation with miralax as constipation may be preceding flares.     Keep updated on more episodes.     Continue PPI for GERD.    Follow Up   Return in about 1 year (around 10/9/2025).    Dragon dictation used throughout this note.     Alyssa Avilez PA-C

## 2025-01-18 DIAGNOSIS — I10 PRIMARY HYPERTENSION: ICD-10-CM

## 2025-01-20 RX ORDER — AMLODIPINE BESYLATE 2.5 MG/1
2.5 TABLET ORAL DAILY
Qty: 90 TABLET | Refills: 1 | Status: SHIPPED | OUTPATIENT
Start: 2025-01-20

## 2025-01-22 DIAGNOSIS — E78.2 MIXED HYPERLIPIDEMIA: ICD-10-CM

## 2025-01-22 RX ORDER — ROSUVASTATIN CALCIUM 5 MG/1
5 TABLET, COATED ORAL DAILY
Qty: 90 TABLET | Refills: 1 | Status: SHIPPED | OUTPATIENT
Start: 2025-01-22

## 2025-02-13 ENCOUNTER — TELEMEDICINE (OUTPATIENT)
Dept: INTERNAL MEDICINE | Facility: CLINIC | Age: 75
End: 2025-02-13
Payer: MEDICARE

## 2025-02-13 ENCOUNTER — TELEPHONE (OUTPATIENT)
Dept: INTERNAL MEDICINE | Facility: CLINIC | Age: 75
End: 2025-02-13

## 2025-02-13 DIAGNOSIS — U07.1 COVID-19 VIRUS INFECTION: Primary | ICD-10-CM

## 2025-02-13 PROCEDURE — 1160F RVW MEDS BY RX/DR IN RCRD: CPT | Performed by: FAMILY MEDICINE

## 2025-02-13 PROCEDURE — 1126F AMNT PAIN NOTED NONE PRSNT: CPT | Performed by: FAMILY MEDICINE

## 2025-02-13 PROCEDURE — 1159F MED LIST DOCD IN RCRD: CPT | Performed by: FAMILY MEDICINE

## 2025-02-13 PROCEDURE — 99213 OFFICE O/P EST LOW 20 MIN: CPT | Performed by: FAMILY MEDICINE

## 2025-02-13 NOTE — TELEPHONE ENCOUNTER
Caller: Sosa Diamond    Relationship: Self    Best call back number: [phone]    What medication are you requesting: PAXLOVID    What are your current symptoms: COVID POSITIVE    How long have you been experiencing symptoms: SUNDAY NIGHT INTO MONDAY MORNING     Have you had these symptoms before:    [] Yes  [x] No    Have you been treated for these symptoms before:   [] Yes  [x] No    If a prescription is needed, what is your preferred pharmacy and phone number: [facility] [id number] - [facility] [address] [facility] [phone] [facility] [phone]      Additional notes: STATED THAT THEY WERE LOOKING TO SEE ABOUT GETTING THE MEDICATION OR SOME RECOMMENDATIONS FOR OTHER MEDICATIONS THAT THEY COULD GET TO HELP WITH THIS. PLEASE CALL AND ADVISE

## 2025-02-13 NOTE — PROGRESS NOTES
"Chief Complaint  No chief complaint on file.    Subjective        Sosa Diamond presents to [facility] PRIMARY [facility]  History of Present Illness  You have chosen to receive care through a telehealth visit.  Do you consent to use a video/audio connection for your medical care today? Yes       History of Present Illness  The patient is a 89-year-old female who presents via virtual visit for evaluation of COVID-19.    She initially experienced symptoms of a severe cold, including drainage, sore throat, and sinus congestion, which began on Sunday night. She tested positive for COVID-19 yesterday, marking her second encounter with the virus, the first being a few years prior. Despite being fully vaccinated, she has been experiencing increased fatigue and headaches today, symptoms she did not previously associate with COVID-19. She also reports a burning sensation in her eyes and skin discomfort. She has undergone two different tests, both of which confirmed her COVID-19 positive status. She was in contact with her ex-daughter-in-law, who frequently contracts COVID-19. She is uncertain about the necessity of Paxlovid and is seeking advice on whether she can recover without it. She reports no fever, wheezing, or significant lower respiratory symptoms. She does not have any breathing difficulties. She has been managing her symptoms with Mucinex, Tylenol, Advil, and Tylenol Flu. She has been maintaining hydration by drinking water constantly. Today, she managed to get out of bed and dress up for this consultation, but she plans to rest afterward.    MEDICATIONS  Current: Advil, Tylenol, Mucinex    IMMUNIZATIONS  She has received all her COVID-19 vaccines.    Objective   Vital Signs:  There were no vitals taken for this visit.  Estimated body mass index is 28.56 kg/m² as calculated from the following:    Height as of 10/9/24: 172.7 cm (67.99\").    Weight as of 10/9/24: 85.2 kg (187 lb 12.8 oz).    BMI is >= " 25 and <30. (Overweight) The following options were offered after discussion;: none (medical contraindication)      Physical Exam  Constitutional:       Appearance: She is ill-appearing.   HENT:      Nose: Congestion present.   Pulmonary:      Effort: Pulmonary effort is normal.   Neurological:      Mental Status: She is alert.   Psychiatric:         Mood and Affect: Mood normal.         Behavior: Behavior normal.        Physical Exam      Result Review :  The following data was reviewed by: Zaheer Mccrary MD on 02/13/2025:  Common labs          8/13/2024    12:47 9/6/2024    14:42   Common Labs   Glucose 95  118    BUN 11  9    Creatinine 0.71  0.76    Sodium 139  137    Potassium 4.8  3.5    Chloride 102  105    Calcium 10.2  9.7    Albumin 4.8  4.3    Total Bilirubin 0.6  0.3    Alkaline Phosphatase 74  61    AST (SGOT) 30  23    ALT (SGPT) 29  23    WBC 7.18  8.66    Hemoglobin 14.9  13.7    Hematocrit 43.8  40.3    Platelets 295  286    Total Cholesterol 161     Triglycerides 120     HDL Cholesterol 58     LDL Cholesterol  82     Hemoglobin A1C 4.70          Results  Laboratory Studies  COVID-19 test was positive.             Assessment and Plan   Diagnoses and all orders for this visit:    1. COVID-19 virus infection (Primary)      Assessment & Plan  1. COVID-19.  Her symptoms are indicative of a recovery phase from COVID-19, with the absence of fever and significant lower respiratory symptoms suggesting a mild course of the disease. The potential side effects of Paxlovid, including nausea, vomiting, diarrhea, and a possible rebound case of COVID-19, were discussed. Given her current symptomatology, the risks associated with Paxlovid may outweigh its benefits. She was advised to continue her current regimen of Mucinex, Tylenol, and Advil. Additionally, she was recommended to take 10,000 units of vitamin D for 3 consecutive days, along with quercetin and 15 mg of zinc daily for at least 3 days. She was encouraged  to maintain adequate hydration. If her condition deteriorates between now and tomorrow, she will inform us, and a prescription for Paxlovid will be considered.         Follow Up   No follow-ups on file.    Patient or patient representative verbalized consent for the use of Ambient Listening during the visit with  Zaheer Mccrary MD for chart documentation. 2/13/2025  14:59 EST    Patient was given instructions and counseling regarding her condition or for health maintenance advice. Please see specific information pulled into the AVS if appropriate.

## 2025-02-28 DIAGNOSIS — F41.9 ANXIETY: ICD-10-CM

## 2025-02-28 DIAGNOSIS — R09.89 LABILE BLOOD PRESSURE: ICD-10-CM

## 2025-02-28 RX ORDER — ALPRAZOLAM 0.5 MG
0.5 TABLET ORAL NIGHTLY PRN
Qty: 30 TABLET | Refills: 0 | Status: SHIPPED | OUTPATIENT
Start: 2025-02-28

## 2025-03-12 ENCOUNTER — OFFICE VISIT (OUTPATIENT)
Dept: INTERNAL MEDICINE | Facility: CLINIC | Age: 75
End: 2025-03-12
Payer: MEDICARE

## 2025-03-12 VITALS
HEART RATE: 69 BPM | OXYGEN SATURATION: 99 % | SYSTOLIC BLOOD PRESSURE: 126 MMHG | WEIGHT: 190 LBS | BODY MASS INDEX: 28.9 KG/M2 | DIASTOLIC BLOOD PRESSURE: 88 MMHG

## 2025-03-12 DIAGNOSIS — K21.00 GASTROESOPHAGEAL REFLUX DISEASE WITH ESOPHAGITIS WITHOUT HEMORRHAGE: ICD-10-CM

## 2025-03-12 DIAGNOSIS — M46.97 UNSPECIFIED INFLAMMATORY SPONDYLOPATHY, LUMBOSACRAL REGION: ICD-10-CM

## 2025-03-12 DIAGNOSIS — E53.8 B12 DEFICIENCY: ICD-10-CM

## 2025-03-12 DIAGNOSIS — R07.2 PRECORDIAL CHEST PAIN: ICD-10-CM

## 2025-03-12 DIAGNOSIS — M13.0 POLYARTHRITIS: ICD-10-CM

## 2025-03-12 DIAGNOSIS — R73.09 ELEVATED GLUCOSE: ICD-10-CM

## 2025-03-12 DIAGNOSIS — E78.2 MIXED HYPERLIPIDEMIA: Primary | ICD-10-CM

## 2025-03-12 DIAGNOSIS — K76.0 FATTY LIVER: ICD-10-CM

## 2025-03-12 DIAGNOSIS — E55.9 VITAMIN D DEFICIENCY: ICD-10-CM

## 2025-03-12 NOTE — PROGRESS NOTES
Chief Complaint  Heartburn and Hyperlipidemia    Subjective        Sosa Diamond presents to [facility]    Wellness visit  Symptoms include: joint pain.   Pertinent negative symptoms include no abdominal pain, no anorexia, no change in stool, no chest pain, no chills, no congestion, no cough, no diaphoresis, no fatigue, no fever, no headaches, no joint swelling, no myalgias, no nausea, no neck pain, no numbness, no rash, no sore throat, no swollen glands, no dysuria, no vertigo, no visual change, no vomiting and no weakness.     History of Present Illness  The patient is a 74-year-old female who presents for evaluation of back pain, sciatica, arthritis, and breast pain.    She reports experiencing lower back pain radiating down the posterior aspect of her leg, which she attributes to nerve impingement. She has been diagnosed with mild stenosis, bulging discs, and arthritis in the affected area. She is currently awaiting an epidural injection, a treatment that has previously provided significant relief from her symptoms. She also reports intermittent knee pain, which she believes is related to her other musculoskeletal issues. Despite these symptoms, she maintains an active lifestyle, engaging in physical exercise 3 to 5 days per week and performing household chores. She tries not to take Advil, Tylenol, or Aleve too much due to pain. She has been taking more Tylenol than Advil because she thought that would affect her kidneys. She might take 2 tablets 3 times a week.    She experienced a brief episode of sharp, stabbing breast pain last week, which she initially feared might be indicative of a heart attack. However, the pain was transient and has not recurred since. She does not report any associated shortness of breath or other concerning symptoms. She recently underwent a mammogram, the results of which were normal.    She had COVID-19 on 02/13/2025. It was like a bad cold and  "basically an upper respiratory infection.    She is on Prempro for hormone replacement and has been on it for a long time. She feels like it balances her body out and gives her more energy. She does not have osteoporosis or anything. She is kind of afraid to get off of it because she feels so good and is afraid if she gets off, she might have a problem.    She had a CT scan of the abdomen at [facility] a couple of years ago, and they mentioned fatty liver. She drinks wine. She is on amlodipine for blood pressure, Xanax as needed, Zyrtec as needed for allergies, omeprazole for heartburn, Prempro for hormone replacement, and a small dose of rosuvastatin.    SOCIAL HISTORY  She drinks wine.    FAMILY HISTORY  There is no diabetes in her family that she is aware of.    MEDICATIONS  Amlodipine, Xanax (as needed), Zyrtec (as needed), omeprazole, Prempro, rosuvastatin.    Objective   Vital Signs:  /88 (BP Location: Left arm, Patient Position: Sitting, Cuff Size: Adult)   Pulse 69   Wt 86.2 kg (190 lb)   SpO2 99%   BMI 28.90 kg/m²   Estimated body mass index is 28.9 kg/m² as calculated from the following:    Height as of 10/9/24: 172.7 cm (67.99\").    Weight as of this encounter: 86.2 kg (190 lb).    BMI is >= 25 and <30. (Overweight) The following options were offered after discussion;: exercise counseling/recommendations and nutrition counseling/recommendations      Physical Exam  Vitals reviewed.   Constitutional:       Appearance: She is well-developed.   HENT:      Head: Normocephalic and atraumatic.      Right Ear: Tympanic membrane and external ear normal.      Left Ear: Tympanic membrane and external ear normal.      Nose: Nose normal.   Eyes:      Conjunctiva/sclera: Conjunctivae normal.      Pupils: Pupils are equal, round, and reactive to light.   Neck:      Thyroid: No thyromegaly.      Vascular: No JVD.   Cardiovascular:      Rate and Rhythm: Normal rate and regular rhythm.      Heart sounds: Normal " heart sounds.   Pulmonary:      Effort: Pulmonary effort is normal.      Breath sounds: Normal breath sounds.   Abdominal:      General: Bowel sounds are normal.      Palpations: Abdomen is soft.   Musculoskeletal:      Cervical back: Normal range of motion and neck supple.      Lumbar back: Spasms present. Decreased range of motion.   Lymphadenopathy:      Cervical: No cervical adenopathy.   Skin:     General: Skin is warm and dry.      Findings: No rash.   Neurological:      Mental Status: She is alert and oriented to person, place, and time.      Cranial Nerves: No cranial nerve deficit.      Coordination: Coordination normal.   Psychiatric:         Behavior: Behavior normal.         Thought Content: Thought content normal.         Judgment: Judgment normal.      Physical Exam  Lungs were auscultated.    Result Review :  The following data was reviewed by: Zaheer Mccrary MD on 03/12/2025:  Common labs          8/13/2024    12:47 9/6/2024    14:42   Common Labs   Glucose 95  118    BUN 11  9    Creatinine 0.71  0.76    Sodium 139  137    Potassium 4.8  3.5    Chloride 102  105    Calcium 10.2  9.7    Albumin 4.8  4.3    Total Bilirubin 0.6  0.3    Alkaline Phosphatase 74  61    AST (SGOT) 30  23    ALT (SGPT) 29  23    WBC 7.18  8.66    Hemoglobin 14.9  13.7    Hematocrit 43.8  40.3    Platelets 295  286    Total Cholesterol 161     Triglycerides 120     HDL Cholesterol 58     LDL Cholesterol  82     Hemoglobin A1C 4.70       Data reviewed : Radiologic studies MRi lumbar spine and CT abdomen    Results  Laboratory Studies  Blood count and chemistry panel from August were normal. A1c tends to run lower than most people. Glucose was 118 six months ago. Kidney and liver function tests were normal. Thyroid function was normal.    Imaging  Previous MRI on 10/18/2023 showed arthritis in the facet joints in the lower back. CT scan of the abdomen at [facility] a couple of years ago suggested fatty liver.      ECG 12  Lead    Date/Time: 3/12/2025 9:05 AM  Performed by: Zaheer Mccrary MD    Authorized by: Zaheer Mccrary MD  Comparison: compared with previous ECG from 12/14/2016  Similar to previous ECG  Rhythm: sinus rhythm  Rate: normal  Conduction: conduction normal  ST Segments: ST segments normal  T Waves: T waves normal  QRS axis: normal    Clinical impression: normal ECG            Assessment and Plan   Diagnoses and all orders for this visit:    1. Mixed hyperlipidemia (Primary)  -     Urinalysis With Microscopic If Indicated (No Culture) - Urine, Clean Catch  -     Lipid Panel With / Chol / HDL Ratio  -     CBC & Differential  -     Comprehensive Metabolic Panel  -     Hemoglobin A1c  -     Sedimentation Rate  -     FIB-4 With Reflex to Enhanced Liver Fibrosis (ELF)  -     C-reactive Protein  -     Vitamin B12  -     Vitamin D,25-Hydroxy  -     Rheumatoid Factor    2. Fatty liver  -     Urinalysis With Microscopic If Indicated (No Culture) - Urine, Clean Catch  -     Lipid Panel With / Chol / HDL Ratio  -     CBC & Differential  -     Comprehensive Metabolic Panel  -     Hemoglobin A1c  -     Sedimentation Rate  -     FIB-4 With Reflex to Enhanced Liver Fibrosis (ELF)  -     C-reactive Protein  -     Vitamin B12  -     Vitamin D,25-Hydroxy  -     Rheumatoid Factor    3. Gastroesophageal reflux disease with esophagitis without hemorrhage    4. Unspecified inflammatory spondylopathy, lumbosacral region  -     Urinalysis With Microscopic If Indicated (No Culture) - Urine, Clean Catch  -     Lipid Panel With / Chol / HDL Ratio  -     CBC & Differential  -     Comprehensive Metabolic Panel  -     Hemoglobin A1c  -     Sedimentation Rate  -     FIB-4 With Reflex to Enhanced Liver Fibrosis (ELF)  -     C-reactive Protein  -     Vitamin B12  -     Vitamin D,25-Hydroxy  -     Rheumatoid Factor    5. Polyarthritis  -     Urinalysis With Microscopic If Indicated (No Culture) - Urine, Clean Catch  -     Lipid Panel With / Chol / HDL  Ratio  -     CBC & Differential  -     Comprehensive Metabolic Panel  -     Hemoglobin A1c  -     Sedimentation Rate  -     FIB-4 With Reflex to Enhanced Liver Fibrosis (ELF)  -     C-reactive Protein  -     Vitamin B12  -     Vitamin D,25-Hydroxy  -     Rheumatoid Factor    6. Vitamin D deficiency  -     Vitamin D,25-Hydroxy    7. B12 deficiency  -     Vitamin B12    8. Elevated glucose  -     Hemoglobin A1c    Addendum EKG is unchanged from December 14, 2016 as normal sinus rhythm no evidence of any ischemia or prior damage or arrhythmia.  Assessment & Plan  1. Back pain.  She reports lower back pain with radiation down the leg, consistent with sciatica. Previous MRI on 10/18/2023 showed arthritis in the facet joints in the lower back. An epidural injection is planned to alleviate inflammation and pain.    2. Arthritis.  She experiences arthritis in her knees and lower back. Inflammatory arthritis tests will be conducted to check for polymyalgia rheumatica. Sedimentation rate and C-reactive protein levels will be checked to assess inflammation.    3. Breast pain.  She reported a shooting pain in her breast that occurred last week but has since resolved. A recent mammogram was normal. An EKG will be performed today to rule out any cardiac issues.    4. Hormone replacement therapy.  She is currently on Prempro and is considering discontinuing it. She feels it balances her body and provides energy. She is advised to continue Prempro as she feels good on it.    5. Suspected fatty liver.  A CT scan from a couple of years ago suggested fatty liver. Liver enzymes have been normal. A blood test for fatty liver will be conducted today to confirm the diagnosis.    6. Health maintenance.  Her A1c levels are lower than average, but she is not diabetic. Her glucose level was 118 six months ago. Kidney and liver functions are normal. Thyroid function is stable. Blood count and chemistry panel from August were normal. A1c will  be rechecked today.         Follow Up   No follow-ups on file.    Patient or patient representative verbalized consent for the use of Ambient Listening during the visit with  Zaheer Mccrary MD for chart documentation. 3/12/2025  08:58 EDT    Patient was given instructions and counseling regarding her condition or for health maintenance advice. Please see specific information pulled into the AVS if appropriate.

## 2025-03-13 LAB
25(OH)D3+25(OH)D2 SERPL-MCNC: 38.4 NG/ML (ref 30–100)
ALBUMIN SERPL-MCNC: 5.1 G/DL (ref 3.8–4.8)
ALP SERPL-CCNC: 77 IU/L (ref 44–121)
ALT SERPL-CCNC: 21 IU/L (ref 0–32)
APPEARANCE UR: CLEAR
AST SERPL-CCNC: 24 IU/L (ref 0–40)
BASOPHILS # BLD AUTO: 0.1 X10E3/UL (ref 0–0.2)
BASOPHILS NFR BLD AUTO: 1 %
BILIRUB SERPL-MCNC: 0.5 MG/DL (ref 0–1.2)
BILIRUB UR QL STRIP: NEGATIVE
BUN SERPL-MCNC: 13 MG/DL (ref 8–27)
BUN/CREAT SERPL: 17 (ref 12–28)
CALCIUM SERPL-MCNC: 10.2 MG/DL (ref 8.7–10.3)
CHLORIDE SERPL-SCNC: 101 MMOL/L (ref 96–106)
CHOLEST SERPL-MCNC: 170 MG/DL (ref 100–199)
CHOLEST/HDLC SERPL: 2.7 RATIO (ref 0–4.4)
CO2 SERPL-SCNC: 23 MMOL/L (ref 20–29)
COLOR UR: YELLOW
CREAT SERPL-MCNC: 0.75 MG/DL (ref 0.57–1)
CRP SERPL-MCNC: 2 MG/L (ref 0–10)
EGFRCR SERPLBLD CKD-EPI 2021: 85 ML/MIN/1.73
EOSINOPHIL # BLD AUTO: 0.1 X10E3/UL (ref 0–0.4)
EOSINOPHIL NFR BLD AUTO: 1 %
ERYTHROCYTE [DISTWIDTH] IN BLOOD BY AUTOMATED COUNT: 12.4 % (ref 11.7–15.4)
ERYTHROCYTE [SEDIMENTATION RATE] IN BLOOD BY WESTERGREN METHOD: 2 MM/HR (ref 0–40)
FIB-4 INDEX: 1.15 (ref 0–2.67)
GLOBULIN SER CALC-MCNC: 2.4 G/DL (ref 1.5–4.5)
GLUCOSE SERPL-MCNC: 99 MG/DL (ref 70–99)
GLUCOSE UR QL STRIP: NEGATIVE
HBA1C MFR BLD: 5 % (ref 4.8–5.6)
HCT VFR BLD AUTO: 46.3 % (ref 34–46.6)
HDLC SERPL-MCNC: 62 MG/DL
HGB BLD-MCNC: 15.3 G/DL (ref 11.1–15.9)
HGB UR QL STRIP: NEGATIVE
IMM GRANULOCYTES # BLD AUTO: 0 X10E3/UL (ref 0–0.1)
IMM GRANULOCYTES NFR BLD AUTO: 0 %
KETONES UR QL STRIP: NEGATIVE
LDLC SERPL CALC-MCNC: 87 MG/DL (ref 0–99)
LEUKOCYTE ESTERASE UR QL STRIP: NEGATIVE
LYMPHOCYTES # BLD AUTO: 2.1 X10E3/UL (ref 0.7–3.1)
LYMPHOCYTES NFR BLD AUTO: 32 %
MCH RBC QN AUTO: 31.7 PG (ref 26.6–33)
MCHC RBC AUTO-ENTMCNC: 33 G/DL (ref 31.5–35.7)
MCV RBC AUTO: 96 FL (ref 79–97)
MICRO URNS: NORMAL
MONOCYTES # BLD AUTO: 0.4 X10E3/UL (ref 0.1–0.9)
MONOCYTES NFR BLD AUTO: 6 %
NEUTROPHILS # BLD AUTO: 4.1 X10E3/UL (ref 1.4–7)
NEUTROPHILS NFR BLD AUTO: 60 %
NITRITE UR QL STRIP: NEGATIVE
PH UR STRIP: 6 [PH] (ref 5–7.5)
PLATELET # BLD AUTO: 327 X10E3/UL (ref 150–450)
POTASSIUM SERPL-SCNC: 4.3 MMOL/L (ref 3.5–5.2)
PROT SERPL-MCNC: 7.5 G/DL (ref 6–8.5)
PROT UR QL STRIP: NEGATIVE
RBC # BLD AUTO: 4.82 X10E6/UL (ref 3.77–5.28)
RHEUMATOID FACT SERPL-ACNC: 11.3 IU/ML
SODIUM SERPL-SCNC: 139 MMOL/L (ref 134–144)
SP GR UR STRIP: 1.01 (ref 1–1.03)
TRIGL SERPL-MCNC: 116 MG/DL (ref 0–149)
UROBILINOGEN UR STRIP-MCNC: 0.2 MG/DL (ref 0.2–1)
VIT B12 SERPL-MCNC: 494 PG/ML (ref 232–1245)
VLDLC SERPL CALC-MCNC: 21 MG/DL (ref 5–40)
WBC # BLD AUTO: 6.7 X10E3/UL (ref 3.4–10.8)

## 2025-04-07 ENCOUNTER — RESULTS FOLLOW-UP (OUTPATIENT)
Dept: INTERNAL MEDICINE | Facility: CLINIC | Age: 75
End: 2025-04-07
Payer: MEDICARE

## 2025-04-07 NOTE — PROGRESS NOTES
Your labs look really good including cholesterol panel and it is a test for their fibrosis which showed low risk.  There is no evidence of diabetes.  The rheumatoid arthritis test was negative.  This does not mean you do not have some type of arthritis with pain.

## 2025-04-14 DIAGNOSIS — F41.9 ANXIETY: ICD-10-CM

## 2025-04-14 DIAGNOSIS — R09.89 LABILE BLOOD PRESSURE: ICD-10-CM

## 2025-04-14 NOTE — TELEPHONE ENCOUNTER
Contract on file    Rx Refill Note  Requested Prescriptions     Pending Prescriptions Disp Refills    ALPRAZolam (XANAX) 0.5 MG tablet [Pharmacy Med Name: ALPRAZolam 0.5 MG TABLET] 30 tablet      Sig: TAKE 1 TABLET BY MOUTH ONCE NIGHTLY AS NEEDED FOR ANXIETY      Last office visit with prescribing clinician: 3/12/2025   Last telemedicine visit with prescribing clinician: 2/13/2025   Next office visit with prescribing clinician: 9/16/2025       Maye Storey MA  04/14/25, 13:33 EDT

## 2025-04-15 RX ORDER — ALPRAZOLAM 0.5 MG
TABLET ORAL
Qty: 30 TABLET | Refills: 4 | Status: SHIPPED | OUTPATIENT
Start: 2025-04-15

## 2025-07-17 DIAGNOSIS — I10 PRIMARY HYPERTENSION: ICD-10-CM

## 2025-07-17 DIAGNOSIS — E78.2 MIXED HYPERLIPIDEMIA: ICD-10-CM

## 2025-07-17 RX ORDER — ROSUVASTATIN CALCIUM 5 MG/1
5 TABLET, COATED ORAL DAILY
Qty: 90 TABLET | Refills: 1 | Status: SHIPPED | OUTPATIENT
Start: 2025-07-17

## 2025-07-17 RX ORDER — AMLODIPINE BESYLATE 2.5 MG/1
2.5 TABLET ORAL DAILY
Qty: 90 TABLET | Refills: 1 | Status: SHIPPED | OUTPATIENT
Start: 2025-07-17

## (undated) DEVICE — THE SINGLE USE ETRAP – POLYP TRAP IS USED FOR SUCTION RETRIEVAL OF ENDOSCOPICALLY REMOVED POLYPS.: Brand: ETRAP

## (undated) DEVICE — CANN O2 ETCO2 FITS ALL CONN CO2 SMPL A/ 7IN DISP LF

## (undated) DEVICE — FRCP BX RADJAW4 NDL 2.8 240CM LG OG BX40

## (undated) DEVICE — THE CARR-LOCKE INJECTION NEEDLE IS A SINGLE USE, DISPOSABLE, FLEXIBLE SHEATH INJECTION NEEDLE USED FOR THE INJECTION OF VARIOUS TYPES OF MEDIA THROUGH FLEXIBLE ENDOSCOPES.

## (undated) DEVICE — SENSR O2 OXIMAX FNGR A/ 18IN NONSTR

## (undated) DEVICE — ADAPT CLN BIOGUARD AIR/H2O DISP

## (undated) DEVICE — SNAR POLYP SENSATION STDOVL 27 240 BX40

## (undated) DEVICE — THE TORRENT IRRIGATION SCOPE CONNECTOR IS USED WITH THE TORRENT IRRIGATION TUBING TO PROVIDE IRRIGATION FLUIDS SUCH AS STERILE WATER DURING GASTROINTESTINAL ENDOSCOPIC PROCEDURES WHEN USED IN CONJUNCTION WITH AN IRRIGATION PUMP (OR ELECTROSURGICAL UNIT).: Brand: TORRENT

## (undated) DEVICE — BITEBLOCK OMNI BLOC

## (undated) DEVICE — TUBING, SUCTION, 1/4" X 10', STRAIGHT: Brand: MEDLINE

## (undated) DEVICE — KT ORCA ORCAPOD DISP STRL

## (undated) DEVICE — LN SMPL CO2 SHTRM SD STREAM W/M LUER